# Patient Record
Sex: MALE | Race: WHITE | Employment: FULL TIME | URBAN - METROPOLITAN AREA
[De-identification: names, ages, dates, MRNs, and addresses within clinical notes are randomized per-mention and may not be internally consistent; named-entity substitution may affect disease eponyms.]

---

## 2018-10-05 RX ORDER — CEFAZOLIN SODIUM/WATER 2 G/20 ML
2 SYRINGE (ML) INTRAVENOUS ONCE
Status: CANCELLED | OUTPATIENT
Start: 2018-10-05 | End: 2018-10-05

## 2018-10-17 ENCOUNTER — HOSPITAL ENCOUNTER (OUTPATIENT)
Dept: SURGERY | Age: 48
Discharge: HOME | End: 2018-10-17
Payer: COMMERCIAL

## 2018-10-17 ENCOUNTER — ANESTHESIA EVENT (OUTPATIENT)
Dept: SURGERY | Age: 48
DRG: 455 | End: 2018-10-17
Payer: COMMERCIAL

## 2018-10-17 VITALS
TEMPERATURE: 98.1 F | HEART RATE: 84 BPM | WEIGHT: 191 LBS | RESPIRATION RATE: 18 BRPM | OXYGEN SATURATION: 98 % | SYSTOLIC BLOOD PRESSURE: 122 MMHG | HEIGHT: 69 IN | DIASTOLIC BLOOD PRESSURE: 78 MMHG | BODY MASS INDEX: 28.29 KG/M2

## 2018-10-17 LAB
ANION GAP SERPL CALC-SCNC: 6 MMOL/L (ref 7–16)
APPEARANCE UR: CLEAR
BACTERIA SPEC CULT: NORMAL
BACTERIA URNS QL MICRO: 0 /HPF
BASOPHILS # BLD: 0.1 K/UL (ref 0–0.2)
BASOPHILS NFR BLD: 1 % (ref 0–2)
BILIRUB UR QL: NEGATIVE
BUN SERPL-MCNC: 13 MG/DL (ref 6–23)
CALCIUM SERPL-MCNC: 8.7 MG/DL (ref 8.3–10.4)
CASTS URNS QL MICRO: 0 /LPF
CHLORIDE SERPL-SCNC: 106 MMOL/L (ref 98–107)
CO2 SERPL-SCNC: 30 MMOL/L (ref 21–32)
COLOR UR: YELLOW
CREAT SERPL-MCNC: 1.01 MG/DL (ref 0.8–1.5)
DIFFERENTIAL METHOD BLD: NORMAL
EOSINOPHIL # BLD: 0.2 K/UL (ref 0–0.8)
EOSINOPHIL NFR BLD: 3 % (ref 0.5–7.8)
EPI CELLS #/AREA URNS HPF: 0 /HPF
ERYTHROCYTE [DISTWIDTH] IN BLOOD BY AUTOMATED COUNT: 12.8 %
GLUCOSE SERPL-MCNC: 115 MG/DL (ref 65–100)
GLUCOSE UR STRIP.AUTO-MCNC: NEGATIVE MG/DL
HCT VFR BLD AUTO: 41.9 % (ref 41.1–50.3)
HGB BLD-MCNC: 13.7 G/DL (ref 13.6–17.2)
HGB UR QL STRIP: NEGATIVE
IMM GRANULOCYTES # BLD: 0 K/UL (ref 0–0.5)
IMM GRANULOCYTES NFR BLD AUTO: 1 % (ref 0–5)
KETONES UR QL STRIP.AUTO: NEGATIVE MG/DL
LEUKOCYTE ESTERASE UR QL STRIP.AUTO: NEGATIVE
LYMPHOCYTES # BLD: 2.2 K/UL (ref 0.5–4.6)
LYMPHOCYTES NFR BLD: 40 % (ref 13–44)
MCH RBC QN AUTO: 30.7 PG (ref 26.1–32.9)
MCHC RBC AUTO-ENTMCNC: 32.7 G/DL (ref 31.4–35)
MCV RBC AUTO: 93.9 FL (ref 79.6–97.8)
MONOCYTES # BLD: 0.7 K/UL (ref 0.1–1.3)
MONOCYTES NFR BLD: 12 % (ref 4–12)
NEUTS SEG # BLD: 2.4 K/UL (ref 1.7–8.2)
NEUTS SEG NFR BLD: 44 % (ref 43–78)
NITRITE UR QL STRIP.AUTO: NEGATIVE
NRBC # BLD: 0 K/UL (ref 0–0.2)
PH UR STRIP: 5 [PH] (ref 5–9)
PLATELET # BLD AUTO: 281 K/UL (ref 150–450)
PMV BLD AUTO: 10 FL (ref 9.4–12.3)
POTASSIUM SERPL-SCNC: 3.8 MMOL/L (ref 3.5–5.1)
PROT UR STRIP-MCNC: 30 MG/DL
RBC # BLD AUTO: 4.46 M/UL (ref 4.23–5.6)
RBC #/AREA URNS HPF: ABNORMAL /HPF
SERVICE CMNT-IMP: NORMAL
SODIUM SERPL-SCNC: 142 MMOL/L (ref 136–145)
SP GR UR REFRACTOMETRY: 1.02 (ref 1–1.02)
UROBILINOGEN UR QL STRIP.AUTO: 0.2 EU/DL (ref 0.2–1)
WBC # BLD AUTO: 5.4 K/UL (ref 4.3–11.1)
WBC URNS QL MICRO: ABNORMAL /HPF

## 2018-10-17 PROCEDURE — 85025 COMPLETE CBC W/AUTO DIFF WBC: CPT

## 2018-10-17 PROCEDURE — 81001 URINALYSIS AUTO W/SCOPE: CPT

## 2018-10-17 PROCEDURE — 80048 BASIC METABOLIC PNL TOTAL CA: CPT

## 2018-10-17 PROCEDURE — 87641 MR-STAPH DNA AMP PROBE: CPT

## 2018-10-17 RX ORDER — TRAMADOL HYDROCHLORIDE 50 MG/1
50 TABLET ORAL
COMMUNITY

## 2018-10-17 RX ORDER — OMEPRAZOLE 20 MG/1
20 CAPSULE, DELAYED RELEASE ORAL
COMMUNITY

## 2018-10-17 RX ORDER — ACETAMINOPHEN 500 MG
1000 TABLET ORAL
COMMUNITY
End: 2018-10-28

## 2018-10-17 RX ORDER — DICLOFENAC POTASSIUM 50 MG/1
50 TABLET, FILM COATED ORAL 3 TIMES DAILY
COMMUNITY
End: 2018-10-28

## 2018-10-17 NOTE — PERIOP NOTES
Patient verified name, , and surgery as listed in [facility]. Patient provided medical/health information and PTA medications to the best of their ability. TYPE  CASE: 3 Orders per surgeon: yes received and dated yes Labs per surgeon: cbc,bmp,urine,mrsa swab. Results: - 
Labs per anesthesia protocol: type and screen- dos. Results - 
EKG:  na 
  
Nasal Swab collected per MD order Patient provided with and instructed on education handouts including Guide to Surgery, blood transfusions, pain management, and hand hygiene for the family and community, and [facility] [place] [place] brochure. Road to Recovery Spine surgery patient guide given. Instructed on incentive spirometry with return demonstration. Long handled prehab sponge given with instructions for use. Patient viewed spine prehab video. Hibiclens and instructions given per hospital policy. Instructed patient to continue previous medications as prescribed prior to surgery unless otherwise directed and to take the following medications the day of surgery according to anesthesia guidelines : tylenol as needed,tramadol as needed,prilosec . Instructed patient to hold  the following medications on the day of surgery: diclofenac. Original medication prescription bottles none visualized during patient appointment. Patient teach back successful and patient demonstrates knowledge of instruction.

## 2018-10-24 NOTE — H&P
Allergies:Sulfa Medications:Diclofenac Sodium (75 MG, Take 1 tablet(s) by mouth 2 times a day as needed [PRN] PAIN); Diclofenac;TraMADol HCl;Xanax (0.5 MG, Take 1 tablet 1 hr. prior to procedure) CC: LOW BACK AND BILATRAL LEG PAIN 
 
HPI:  Preoperative check. He is 48. He has grade 2 to 3 spondylolisthesis at L5-S1. On the x-rays, the disc looks very collapsed. It does not look like we could put an interbody cage in, but on the MRI when he lays down, it reduces a little bit and the disc looks taller. Nonetheless, he basically has L5 foraminal stenosis. I went through his medical history and basically he is healthy. He is not on any blood thinners. PE:  He is a normal-appearing gentleman, in good physical shape. No gross deformity. He is alert and oriented x3. Normal affect. Pupils equal, round and reactive to light. Oropharynx is clear. Neck is without adenopathy. His lungs are clear to auscultation. Heart is regular rate and rhythm. Abdomen is soft and nontender. No hepatosplenomegaly. ASSESSMENT/PLAN:  The plan is to do an L5-S1 laminectomy, foraminotomy and interbody fusion if we could do it, although I do not think we will be able to do it, but we will need to fuse him L4 to S1 because of the negative mechanical advantage with that degree of slip. We also talked about putting a screw through the sacrum into L5. His wife is with him. They had a lot of questions, which I answered. I went through all the risks of surgery including death, paralysis, infection, bleeding, transfusion, heart attack, stroke, clot in leg, clot in lung, dural tear, failure of fusion, failure of instrumentation, development of new problems down the road, and the fact that I cannot guarantee pain relief. He goes for a preoperative check tomorrow, and surgery, I think, a week or so down the road, and it is going to be an L4 to S1 fusion, L4-L5 laminectomy and possible interbody fusion. Electronically Signed By Tia Clinton MD

## 2018-10-25 ENCOUNTER — ANESTHESIA (OUTPATIENT)
Dept: SURGERY | Age: 48
DRG: 455 | End: 2018-10-25
Payer: COMMERCIAL

## 2018-10-25 ENCOUNTER — APPOINTMENT (OUTPATIENT)
Dept: GENERAL RADIOLOGY | Age: 48
DRG: 455 | End: 2018-10-25
Attending: ORTHOPAEDIC SURGERY
Payer: COMMERCIAL

## 2018-10-25 ENCOUNTER — HOSPITAL ENCOUNTER (INPATIENT)
Age: 48
LOS: 3 days | Discharge: HOME | DRG: 455 | End: 2018-10-28
Attending: ORTHOPAEDIC SURGERY | Admitting: ORTHOPAEDIC SURGERY
Payer: COMMERCIAL

## 2018-10-25 DIAGNOSIS — M43.17 SPONDYLOLISTHESIS AT L5-S1 LEVEL: ICD-10-CM

## 2018-10-25 DIAGNOSIS — M48.07 SPINAL STENOSIS OF LUMBOSACRAL REGION: Primary | ICD-10-CM

## 2018-10-25 DIAGNOSIS — M43.17 SPONDYLOLISTHESIS OF LUMBOSACRAL REGION: ICD-10-CM

## 2018-10-25 LAB
ERYTHROCYTE [DISTWIDTH] IN BLOOD BY AUTOMATED COUNT: 12.2 %
HCT VFR BLD AUTO: 36.7 % (ref 41.1–50.3)
HGB BLD-MCNC: 12.6 G/DL (ref 13.6–17.2)
MCH RBC QN AUTO: 31 PG (ref 26.1–32.9)
MCHC RBC AUTO-ENTMCNC: 34.3 G/DL (ref 31.4–35)
MCV RBC AUTO: 90.4 FL (ref 79.6–97.8)
NRBC # BLD: 0 K/UL (ref 0–0.2)
PLATELET # BLD AUTO: 271 K/UL (ref 150–450)
PMV BLD AUTO: 9.6 FL (ref 9.4–12.3)
RBC # BLD AUTO: 4.06 M/UL (ref 4.23–5.6)
WBC # BLD AUTO: 15.9 K/UL (ref 4.3–11.1)

## 2018-10-25 PROCEDURE — 77030031139 HC SUT VCRL2 J&J -A: Performed by: ORTHOPAEDIC SURGERY

## 2018-10-25 PROCEDURE — 77030012894: Performed by: ORTHOPAEDIC SURGERY

## 2018-10-25 PROCEDURE — 74011000272 HC RX REV CODE- 272: Performed by: ORTHOPAEDIC SURGERY

## 2018-10-25 PROCEDURE — 76010000179 HC OR TIME 6 TO 6.5 HR INTENSV-TIER 1: Performed by: ORTHOPAEDIC SURGERY

## 2018-10-25 PROCEDURE — 74011250636 HC RX REV CODE- 250/636

## 2018-10-25 PROCEDURE — 0ST20ZZ RESECTION OF LUMBAR VERTEBRAL DISC, OPEN APPROACH: ICD-10-PCS | Performed by: ORTHOPAEDIC SURGERY

## 2018-10-25 PROCEDURE — 77030038601 HC DEV SYS W/CANN LITE BIO STRY -F: Performed by: ORTHOPAEDIC SURGERY

## 2018-10-25 PROCEDURE — 77030019908 HC STETH ESOPH SIMS -A: Performed by: ANESTHESIOLOGY

## 2018-10-25 PROCEDURE — 77030034760 HC NDL BN MAR ASPIR JAMSH STRY -B: Performed by: ORTHOPAEDIC SURGERY

## 2018-10-25 PROCEDURE — 77030030163 HC BN WAX J&J -A: Performed by: ORTHOPAEDIC SURGERY

## 2018-10-25 PROCEDURE — 74011250636 HC RX REV CODE- 250/636: Performed by: ORTHOPAEDIC SURGERY

## 2018-10-25 PROCEDURE — 77030039425 HC BLD LARYNG TRULITE DISP TELE -A: Performed by: ANESTHESIOLOGY

## 2018-10-25 PROCEDURE — 76210000016 HC OR PH I REC 1 TO 1.5 HR: Performed by: ORTHOPAEDIC SURGERY

## 2018-10-25 PROCEDURE — 77030013292 HC BOWL MX PRSM J&J -A: Performed by: ANESTHESIOLOGY

## 2018-10-25 PROCEDURE — 85027 COMPLETE CBC AUTOMATED: CPT

## 2018-10-25 PROCEDURE — C1713 ANCHOR/SCREW BN/BN,TIS/BN: HCPCS | Performed by: ORTHOPAEDIC SURGERY

## 2018-10-25 PROCEDURE — 77030013794 HC KT TRNSDUC BLD EDWD -B: Performed by: ANESTHESIOLOGY

## 2018-10-25 PROCEDURE — 77030032490 HC SLV COMPR SCD KNE COVD -B: Performed by: ORTHOPAEDIC SURGERY

## 2018-10-25 PROCEDURE — 77030037160 HC CGE SPN POST LUM TRITANIUM STRY -I2: Performed by: ORTHOPAEDIC SURGERY

## 2018-10-25 PROCEDURE — 65270000029 HC RM PRIVATE

## 2018-10-25 PROCEDURE — 74011000250 HC RX REV CODE- 250

## 2018-10-25 PROCEDURE — 77030037710: Performed by: ORTHOPAEDIC SURGERY

## 2018-10-25 PROCEDURE — 77030019557 HC ELECTRD VES SEAL MEDT -F: Performed by: ORTHOPAEDIC SURGERY

## 2018-10-25 PROCEDURE — 77030008477 HC STYL SATN SLP COVD -A: Performed by: ANESTHESIOLOGY

## 2018-10-25 PROCEDURE — 77030008703 HC TU ET UNCUF COVD -A: Performed by: ANESTHESIOLOGY

## 2018-10-25 PROCEDURE — 74011250636 HC RX REV CODE- 250/636: Performed by: ANESTHESIOLOGY

## 2018-10-25 PROCEDURE — 74011250637 HC RX REV CODE- 250/637: Performed by: ORTHOPAEDIC SURGERY

## 2018-10-25 PROCEDURE — 77030014647 HC SEAL FBRN TISSL BAXT -D: Performed by: ORTHOPAEDIC SURGERY

## 2018-10-25 PROCEDURE — 0SG00AJ FUSION OF LUMBAR VERTEBRAL JOINT WITH INTERBODY FUSION DEVICE, POSTERIOR APPROACH, ANTERIOR COLUMN, OPEN APPROACH: ICD-10-PCS | Performed by: ORTHOPAEDIC SURGERY

## 2018-10-25 PROCEDURE — 77030018836 HC SOL IRR NACL ICUM -A: Performed by: ORTHOPAEDIC SURGERY

## 2018-10-25 PROCEDURE — 86901 BLOOD TYPING SEROLOGIC RH(D): CPT

## 2018-10-25 PROCEDURE — 0SG0071 FUSION OF LUMBAR VERTEBRAL JOINT WITH AUTOLOGOUS TISSUE SUBSTITUTE, POSTERIOR APPROACH, POSTERIOR COLUMN, OPEN APPROACH: ICD-10-PCS | Performed by: ORTHOPAEDIC SURGERY

## 2018-10-25 PROCEDURE — 77030020407 HC IV BLD WRMR ST 3M -A: Performed by: ANESTHESIOLOGY

## 2018-10-25 PROCEDURE — 77030039194 HC KT NEURO MONITR ASTU -G: Performed by: ORTHOPAEDIC SURGERY

## 2018-10-25 PROCEDURE — 77030005401 HC CATH RAD ARRO -A: Performed by: ANESTHESIOLOGY

## 2018-10-25 PROCEDURE — 77030027138 HC INCENT SPIROMETER -A

## 2018-10-25 PROCEDURE — 76060000043 HC ANESTHESIA 6 TO 6.5 HR: Performed by: ORTHOPAEDIC SURGERY

## 2018-10-25 PROCEDURE — 74011250637 HC RX REV CODE- 250/637: Performed by: ANESTHESIOLOGY

## 2018-10-25 PROCEDURE — 77030025623 HC BUR RND PRECIS STRY -D: Performed by: ORTHOPAEDIC SURGERY

## 2018-10-25 PROCEDURE — 77030014368: Performed by: ORTHOPAEDIC SURGERY

## 2018-10-25 PROCEDURE — 36415 COLL VENOUS BLD VENIPUNCTURE: CPT

## 2018-10-25 PROCEDURE — 77030031359 HC BLD BN MILL DISP STRY -E: Performed by: ORTHOPAEDIC SURGERY

## 2018-10-25 PROCEDURE — 77030034849: Performed by: ORTHOPAEDIC SURGERY

## 2018-10-25 PROCEDURE — 72100 X-RAY EXAM L-S SPINE 2/3 VWS: CPT

## 2018-10-25 PROCEDURE — 0SG3071 FUSION OF LUMBOSACRAL JOINT WITH AUTOLOGOUS TISSUE SUBSTITUTE, POSTERIOR APPROACH, POSTERIOR COLUMN, OPEN APPROACH: ICD-10-PCS | Performed by: ORTHOPAEDIC SURGERY

## 2018-10-25 PROCEDURE — 07DR3ZZ EXTRACTION OF ILIAC BONE MARROW, PERCUTANEOUS APPROACH: ICD-10-PCS | Performed by: ORTHOPAEDIC SURGERY

## 2018-10-25 DEVICE — POSTERIOR LUMBAR CAGE
Type: IMPLANTABLE DEVICE | Site: SPINE LUMBAR | Status: FUNCTIONAL
Brand: TRITANIUM PL

## 2018-10-25 DEVICE — BLOCKER
Type: IMPLANTABLE DEVICE | Site: SPINE LUMBAR | Status: FUNCTIONAL
Brand: XIA 3

## 2018-10-25 DEVICE — POLYAXIAL SCREW
Type: IMPLANTABLE DEVICE | Site: SPINE LUMBAR | Status: FUNCTIONAL
Brand: XIA 3

## 2018-10-25 DEVICE — TI ALLOY MAX RAD ROD
Type: IMPLANTABLE DEVICE | Site: SPINE LUMBAR | Status: FUNCTIONAL
Brand: XIA 3

## 2018-10-25 DEVICE — BIO DBM PLUS PUTTY (WITH CANCELLOUS)
Type: IMPLANTABLE DEVICE | Site: SPINE LUMBAR | Status: FUNCTIONAL
Brand: BIO DBM

## 2018-10-25 DEVICE — 43-54 MM MULTI AXIAL CROSS CONNECTOR
Type: IMPLANTABLE DEVICE | Site: SPINE LUMBAR | Status: FUNCTIONAL
Brand: XIA 3

## 2018-10-25 DEVICE — GRAFT BNE SUB 20CC 2 4MM GRAN GROWTH FACT ALLGRFT OSTEOAMP: Type: IMPLANTABLE DEVICE | Site: SPINE LUMBAR | Status: FUNCTIONAL

## 2018-10-25 RX ORDER — VANCOMYCIN HYDROCHLORIDE 1 G/20ML
INJECTION, POWDER, LYOPHILIZED, FOR SOLUTION INTRAVENOUS AS NEEDED
Status: DISCONTINUED | OUTPATIENT
Start: 2018-10-25 | End: 2018-10-25 | Stop reason: HOSPADM

## 2018-10-25 RX ORDER — EPHEDRINE SULFATE 50 MG/ML
INJECTION, SOLUTION INTRAVENOUS AS NEEDED
Status: DISCONTINUED | OUTPATIENT
Start: 2018-10-25 | End: 2018-10-25 | Stop reason: HOSPADM

## 2018-10-25 RX ORDER — DEXAMETHASONE SODIUM PHOSPHATE 4 MG/ML
INJECTION, SOLUTION INTRA-ARTICULAR; INTRALESIONAL; INTRAMUSCULAR; INTRAVENOUS; SOFT TISSUE AS NEEDED
Status: DISCONTINUED | OUTPATIENT
Start: 2018-10-25 | End: 2018-10-25 | Stop reason: HOSPADM

## 2018-10-25 RX ORDER — KETAMINE HYDROCHLORIDE 100 MG/ML
INJECTION, SOLUTION INTRAMUSCULAR; INTRAVENOUS AS NEEDED
Status: DISCONTINUED | OUTPATIENT
Start: 2018-10-25 | End: 2018-10-25 | Stop reason: HOSPADM

## 2018-10-25 RX ORDER — SODIUM CHLORIDE 9 MG/ML
INJECTION, SOLUTION INTRAVENOUS
Status: DISCONTINUED | OUTPATIENT
Start: 2018-10-25 | End: 2018-10-25 | Stop reason: HOSPADM

## 2018-10-25 RX ORDER — ONDANSETRON 2 MG/ML
4 INJECTION INTRAMUSCULAR; INTRAVENOUS
Status: DISCONTINUED | OUTPATIENT
Start: 2018-10-25 | End: 2018-10-28 | Stop reason: HOSPADM

## 2018-10-25 RX ORDER — FENTANYL CITRATE 50 UG/ML
INJECTION, SOLUTION INTRAMUSCULAR; INTRAVENOUS AS NEEDED
Status: DISCONTINUED | OUTPATIENT
Start: 2018-10-25 | End: 2018-10-25 | Stop reason: HOSPADM

## 2018-10-25 RX ORDER — NALOXONE HYDROCHLORIDE 0.4 MG/ML
0.1 INJECTION, SOLUTION INTRAMUSCULAR; INTRAVENOUS; SUBCUTANEOUS AS NEEDED
Status: DISCONTINUED | OUTPATIENT
Start: 2018-10-25 | End: 2018-10-25 | Stop reason: HOSPADM

## 2018-10-25 RX ORDER — SODIUM CHLORIDE 0.9 % (FLUSH) 0.9 %
5-10 SYRINGE (ML) INJECTION EVERY 8 HOURS
Status: DISCONTINUED | OUTPATIENT
Start: 2018-10-25 | End: 2018-10-28 | Stop reason: HOSPADM

## 2018-10-25 RX ORDER — SODIUM CHLORIDE 0.9 % (FLUSH) 0.9 %
5-10 SYRINGE (ML) INJECTION AS NEEDED
Status: DISCONTINUED | OUTPATIENT
Start: 2018-10-25 | End: 2018-10-25 | Stop reason: HOSPADM

## 2018-10-25 RX ORDER — LIDOCAINE HYDROCHLORIDE 10 MG/ML
0.1 INJECTION INFILTRATION; PERINEURAL AS NEEDED
Status: DISCONTINUED | OUTPATIENT
Start: 2018-10-25 | End: 2018-10-25 | Stop reason: HOSPADM

## 2018-10-25 RX ORDER — OXYCODONE HYDROCHLORIDE 5 MG/1
5 TABLET ORAL
Status: DISCONTINUED | OUTPATIENT
Start: 2018-10-25 | End: 2018-10-25 | Stop reason: HOSPADM

## 2018-10-25 RX ORDER — HYDROMORPHONE HYDROCHLORIDE 1 MG/ML
1 INJECTION, SOLUTION INTRAMUSCULAR; INTRAVENOUS; SUBCUTANEOUS
Status: DISCONTINUED | OUTPATIENT
Start: 2018-10-25 | End: 2018-10-28 | Stop reason: HOSPADM

## 2018-10-25 RX ORDER — MIDAZOLAM HYDROCHLORIDE 1 MG/ML
2 INJECTION, SOLUTION INTRAMUSCULAR; INTRAVENOUS
Status: COMPLETED | OUTPATIENT
Start: 2018-10-25 | End: 2018-10-25

## 2018-10-25 RX ORDER — IBUPROFEN 400 MG/1
400 TABLET ORAL
Status: DISCONTINUED | OUTPATIENT
Start: 2018-10-25 | End: 2018-10-28 | Stop reason: HOSPADM

## 2018-10-25 RX ORDER — HYDROMORPHONE HYDROCHLORIDE 2 MG/ML
0.5 INJECTION, SOLUTION INTRAMUSCULAR; INTRAVENOUS; SUBCUTANEOUS
Status: DISCONTINUED | OUTPATIENT
Start: 2018-10-25 | End: 2018-10-25 | Stop reason: HOSPADM

## 2018-10-25 RX ORDER — FENTANYL CITRATE 50 UG/ML
100 INJECTION, SOLUTION INTRAMUSCULAR; INTRAVENOUS AS NEEDED
Status: DISCONTINUED | OUTPATIENT
Start: 2018-10-25 | End: 2018-10-25 | Stop reason: HOSPADM

## 2018-10-25 RX ORDER — DIPHENHYDRAMINE HYDROCHLORIDE 50 MG/ML
12.5 INJECTION, SOLUTION INTRAMUSCULAR; INTRAVENOUS ONCE
Status: DISCONTINUED | OUTPATIENT
Start: 2018-10-25 | End: 2018-10-25 | Stop reason: RX

## 2018-10-25 RX ORDER — ROCURONIUM BROMIDE 10 MG/ML
INJECTION, SOLUTION INTRAVENOUS AS NEEDED
Status: DISCONTINUED | OUTPATIENT
Start: 2018-10-25 | End: 2018-10-25 | Stop reason: HOSPADM

## 2018-10-25 RX ORDER — CEFAZOLIN SODIUM/WATER 2 G/20 ML
2 SYRINGE (ML) INTRAVENOUS ONCE
Status: COMPLETED | OUTPATIENT
Start: 2018-10-25 | End: 2018-10-25

## 2018-10-25 RX ORDER — DIPHENHYDRAMINE HCL 25 MG
25 CAPSULE ORAL
Status: DISCONTINUED | OUTPATIENT
Start: 2018-10-25 | End: 2018-10-28 | Stop reason: HOSPADM

## 2018-10-25 RX ORDER — SODIUM CHLORIDE, SODIUM LACTATE, POTASSIUM CHLORIDE, CALCIUM CHLORIDE 600; 310; 30; 20 MG/100ML; MG/100ML; MG/100ML; MG/100ML
75 INJECTION, SOLUTION INTRAVENOUS CONTINUOUS
Status: DISCONTINUED | OUTPATIENT
Start: 2018-10-25 | End: 2018-10-25 | Stop reason: HOSPADM

## 2018-10-25 RX ORDER — SODIUM CHLORIDE, SODIUM LACTATE, POTASSIUM CHLORIDE, CALCIUM CHLORIDE 600; 310; 30; 20 MG/100ML; MG/100ML; MG/100ML; MG/100ML
1000 INJECTION, SOLUTION INTRAVENOUS CONTINUOUS
Status: DISCONTINUED | OUTPATIENT
Start: 2018-10-25 | End: 2018-10-25 | Stop reason: HOSPADM

## 2018-10-25 RX ORDER — ADHESIVE BANDAGE
30 BANDAGE TOPICAL DAILY
Status: DISCONTINUED | OUTPATIENT
Start: 2018-10-26 | End: 2018-10-28 | Stop reason: HOSPADM

## 2018-10-25 RX ORDER — PROPOFOL 10 MG/ML
INJECTION, EMULSION INTRAVENOUS AS NEEDED
Status: DISCONTINUED | OUTPATIENT
Start: 2018-10-25 | End: 2018-10-25 | Stop reason: HOSPADM

## 2018-10-25 RX ORDER — NEOSTIGMINE METHYLSULFATE 1 MG/ML
INJECTION INTRAVENOUS AS NEEDED
Status: DISCONTINUED | OUTPATIENT
Start: 2018-10-25 | End: 2018-10-25 | Stop reason: HOSPADM

## 2018-10-25 RX ORDER — GABAPENTIN 100 MG/1
100 CAPSULE ORAL 2 TIMES DAILY
Status: DISCONTINUED | OUTPATIENT
Start: 2018-10-25 | End: 2018-10-28 | Stop reason: HOSPADM

## 2018-10-25 RX ORDER — ACETAMINOPHEN 500 MG
500 TABLET ORAL ONCE
Status: DISCONTINUED | OUTPATIENT
Start: 2018-10-25 | End: 2018-10-25 | Stop reason: HOSPADM

## 2018-10-25 RX ORDER — NYSTATIN 100000 [USP'U]/ML
500000 SUSPENSION ORAL 4 TIMES DAILY
Status: DISCONTINUED | OUTPATIENT
Start: 2018-10-25 | End: 2018-10-28 | Stop reason: HOSPADM

## 2018-10-25 RX ORDER — SODIUM CHLORIDE, SODIUM LACTATE, POTASSIUM CHLORIDE, CALCIUM CHLORIDE 600; 310; 30; 20 MG/100ML; MG/100ML; MG/100ML; MG/100ML
INJECTION, SOLUTION INTRAVENOUS
Status: DISCONTINUED | OUTPATIENT
Start: 2018-10-25 | End: 2018-10-25 | Stop reason: HOSPADM

## 2018-10-25 RX ORDER — SODIUM CHLORIDE 0.9 % (FLUSH) 0.9 %
5-10 SYRINGE (ML) INJECTION AS NEEDED
Status: DISCONTINUED | OUTPATIENT
Start: 2018-10-25 | End: 2018-10-28 | Stop reason: HOSPADM

## 2018-10-25 RX ORDER — GABAPENTIN 300 MG/1
600 CAPSULE ORAL ONCE
Status: COMPLETED | OUTPATIENT
Start: 2018-10-25 | End: 2018-10-25

## 2018-10-25 RX ORDER — PANTOPRAZOLE SODIUM 40 MG/1
40 TABLET, DELAYED RELEASE ORAL
Status: DISCONTINUED | OUTPATIENT
Start: 2018-10-26 | End: 2018-10-28 | Stop reason: HOSPADM

## 2018-10-25 RX ORDER — LIDOCAINE HYDROCHLORIDE 20 MG/ML
INJECTION, SOLUTION EPIDURAL; INFILTRATION; INTRACAUDAL; PERINEURAL AS NEEDED
Status: DISCONTINUED | OUTPATIENT
Start: 2018-10-25 | End: 2018-10-25 | Stop reason: HOSPADM

## 2018-10-25 RX ORDER — CEFAZOLIN SODIUM/WATER 2 G/20 ML
2 SYRINGE (ML) INTRAVENOUS EVERY 8 HOURS
Status: COMPLETED | OUTPATIENT
Start: 2018-10-25 | End: 2018-10-26

## 2018-10-25 RX ORDER — GLYCOPYRROLATE 0.2 MG/ML
INJECTION INTRAMUSCULAR; INTRAVENOUS AS NEEDED
Status: DISCONTINUED | OUTPATIENT
Start: 2018-10-25 | End: 2018-10-25 | Stop reason: HOSPADM

## 2018-10-25 RX ORDER — OXYCODONE HYDROCHLORIDE 5 MG/1
10 TABLET ORAL
Status: DISCONTINUED | OUTPATIENT
Start: 2018-10-25 | End: 2018-10-25 | Stop reason: HOSPADM

## 2018-10-25 RX ORDER — ONDANSETRON 2 MG/ML
INJECTION INTRAMUSCULAR; INTRAVENOUS AS NEEDED
Status: DISCONTINUED | OUTPATIENT
Start: 2018-10-25 | End: 2018-10-25 | Stop reason: HOSPADM

## 2018-10-25 RX ORDER — DEXTROSE, SODIUM CHLORIDE, AND POTASSIUM CHLORIDE 5; .45; .15 G/100ML; G/100ML; G/100ML
100 INJECTION INTRAVENOUS CONTINUOUS
Status: DISCONTINUED | OUTPATIENT
Start: 2018-10-25 | End: 2018-10-28 | Stop reason: HOSPADM

## 2018-10-25 RX ORDER — SODIUM CHLORIDE 0.9 % (FLUSH) 0.9 %
5-10 SYRINGE (ML) INJECTION EVERY 8 HOURS
Status: DISCONTINUED | OUTPATIENT
Start: 2018-10-25 | End: 2018-10-25 | Stop reason: HOSPADM

## 2018-10-25 RX ORDER — ONDANSETRON 2 MG/ML
4 INJECTION INTRAMUSCULAR; INTRAVENOUS ONCE
Status: DISCONTINUED | OUTPATIENT
Start: 2018-10-25 | End: 2018-10-25 | Stop reason: HOSPADM

## 2018-10-25 RX ORDER — HYDROCODONE BITARTRATE AND ACETAMINOPHEN 10; 325 MG/1; MG/1
1 TABLET ORAL
Status: DISCONTINUED | OUTPATIENT
Start: 2018-10-25 | End: 2018-10-27

## 2018-10-25 RX ORDER — TRAMADOL HYDROCHLORIDE 50 MG/1
50 TABLET ORAL
Status: DISCONTINUED | OUTPATIENT
Start: 2018-10-25 | End: 2018-10-28 | Stop reason: HOSPADM

## 2018-10-25 RX ADMIN — LIDOCAINE HYDROCHLORIDE 60 MG: 20 INJECTION, SOLUTION EPIDURAL; INFILTRATION; INTRACAUDAL; PERINEURAL at 07:21

## 2018-10-25 RX ADMIN — GABAPENTIN 100 MG: 100 CAPSULE ORAL at 17:32

## 2018-10-25 RX ADMIN — Medication 1 AMPULE: at 21:30

## 2018-10-25 RX ADMIN — HYDROCODONE BITARTRATE AND ACETAMINOPHEN 1 TABLET: 10; 325 TABLET ORAL at 21:30

## 2018-10-25 RX ADMIN — Medication 10 ML: at 23:53

## 2018-10-25 RX ADMIN — FENTANYL CITRATE 100 MCG: 50 INJECTION, SOLUTION INTRAMUSCULAR; INTRAVENOUS at 07:21

## 2018-10-25 RX ADMIN — KETAMINE HYDROCHLORIDE 20 MG: 100 INJECTION, SOLUTION INTRAMUSCULAR; INTRAVENOUS at 11:40

## 2018-10-25 RX ADMIN — Medication 5 ML: at 17:33

## 2018-10-25 RX ADMIN — KETAMINE HYDROCHLORIDE 20 MG: 100 INJECTION, SOLUTION INTRAMUSCULAR; INTRAVENOUS at 09:40

## 2018-10-25 RX ADMIN — SODIUM CHLORIDE, SODIUM LACTATE, POTASSIUM CHLORIDE, AND CALCIUM CHLORIDE 1000 ML: 600; 310; 30; 20 INJECTION, SOLUTION INTRAVENOUS at 06:12

## 2018-10-25 RX ADMIN — NEOSTIGMINE METHYLSULFATE 2 MG: 1 INJECTION INTRAVENOUS at 12:39

## 2018-10-25 RX ADMIN — ROCURONIUM BROMIDE 30 MG: 10 INJECTION, SOLUTION INTRAVENOUS at 07:22

## 2018-10-25 RX ADMIN — Medication 2 G: at 11:15

## 2018-10-25 RX ADMIN — HYDROMORPHONE HYDROCHLORIDE 1 MG: 1 INJECTION, SOLUTION INTRAMUSCULAR; INTRAVENOUS; SUBCUTANEOUS at 16:01

## 2018-10-25 RX ADMIN — EPHEDRINE SULFATE 5 MG: 50 INJECTION, SOLUTION INTRAVENOUS at 11:53

## 2018-10-25 RX ADMIN — NYSTATIN 500000 UNITS: 500000 SUSPENSION ORAL at 21:30

## 2018-10-25 RX ADMIN — TRAMADOL HYDROCHLORIDE 50 MG: 50 TABLET, FILM COATED ORAL at 20:02

## 2018-10-25 RX ADMIN — Medication 3 AMPULE: at 06:24

## 2018-10-25 RX ADMIN — FENTANYL CITRATE 25 MCG: 50 INJECTION, SOLUTION INTRAMUSCULAR; INTRAVENOUS at 12:17

## 2018-10-25 RX ADMIN — HYDROCODONE BITARTRATE AND ACETAMINOPHEN 1 TABLET: 10; 325 TABLET ORAL at 17:31

## 2018-10-25 RX ADMIN — ONDANSETRON 4 MG: 2 INJECTION INTRAMUSCULAR; INTRAVENOUS at 12:34

## 2018-10-25 RX ADMIN — DEXAMETHASONE SODIUM PHOSPHATE 4 MG: 4 INJECTION, SOLUTION INTRA-ARTICULAR; INTRALESIONAL; INTRAMUSCULAR; INTRAVENOUS; SOFT TISSUE at 07:27

## 2018-10-25 RX ADMIN — FENTANYL CITRATE 25 MCG: 50 INJECTION, SOLUTION INTRAMUSCULAR; INTRAVENOUS at 08:59

## 2018-10-25 RX ADMIN — GLYCOPYRROLATE 0.4 MG: 0.2 INJECTION INTRAMUSCULAR; INTRAVENOUS at 12:39

## 2018-10-25 RX ADMIN — HYDROMORPHONE HYDROCHLORIDE 0.5 MG: 2 INJECTION, SOLUTION INTRAMUSCULAR; INTRAVENOUS; SUBCUTANEOUS at 14:05

## 2018-10-25 RX ADMIN — PROPOFOL 200 MG: 10 INJECTION, EMULSION INTRAVENOUS at 07:21

## 2018-10-25 RX ADMIN — Medication 10 ML: at 21:31

## 2018-10-25 RX ADMIN — SODIUM CHLORIDE, SODIUM LACTATE, POTASSIUM CHLORIDE, CALCIUM CHLORIDE: 600; 310; 30; 20 INJECTION, SOLUTION INTRAVENOUS at 09:58

## 2018-10-25 RX ADMIN — SODIUM CHLORIDE, SODIUM LACTATE, POTASSIUM CHLORIDE, CALCIUM CHLORIDE: 600; 310; 30; 20 INJECTION, SOLUTION INTRAVENOUS at 07:15

## 2018-10-25 RX ADMIN — EPHEDRINE SULFATE 5 MG: 50 INJECTION, SOLUTION INTRAVENOUS at 12:10

## 2018-10-25 RX ADMIN — HYDROMORPHONE HYDROCHLORIDE 1 MG: 1 INJECTION, SOLUTION INTRAMUSCULAR; INTRAVENOUS; SUBCUTANEOUS at 23:51

## 2018-10-25 RX ADMIN — KETAMINE HYDROCHLORIDE 20 MG: 100 INJECTION, SOLUTION INTRAMUSCULAR; INTRAVENOUS at 08:40

## 2018-10-25 RX ADMIN — KETAMINE HYDROCHLORIDE 40 MG: 100 INJECTION, SOLUTION INTRAMUSCULAR; INTRAVENOUS at 07:40

## 2018-10-25 RX ADMIN — Medication 2 G: at 19:32

## 2018-10-25 RX ADMIN — KETAMINE HYDROCHLORIDE 20 MG: 100 INJECTION, SOLUTION INTRAMUSCULAR; INTRAVENOUS at 10:40

## 2018-10-25 RX ADMIN — GABAPENTIN 600 MG: 300 CAPSULE ORAL at 07:06

## 2018-10-25 RX ADMIN — Medication 2 G: at 07:30

## 2018-10-25 RX ADMIN — DEXTROSE MONOHYDRATE, SODIUM CHLORIDE, AND POTASSIUM CHLORIDE 100 ML/HR: 50; 4.5; 1.49 INJECTION, SOLUTION INTRAVENOUS at 17:28

## 2018-10-25 RX ADMIN — SODIUM CHLORIDE: 9 INJECTION, SOLUTION INTRAVENOUS at 08:05

## 2018-10-25 RX ADMIN — MIDAZOLAM HYDROCHLORIDE 2 MG: 2 INJECTION, SOLUTION INTRAMUSCULAR; INTRAVENOUS at 07:10

## 2018-10-25 NOTE — ANESTHESIA PROCEDURE NOTES
Arterial Line Placement Start time: 10/25/2018 7:22 AM 
End time: 10/25/2018 7:27 AM 
Performed by: Austin Sotelo CRNA Authorized by: Bi Jimenez MD  
 
Pre-Procedure Indications:  Arterial pressure monitoring and blood sampling Preanesthetic Checklist: patient identified, risks and benefits discussed, anesthesia consent, site marked, patient being monitored, timeout performed and patient being monitored Timeout Time: 07:22 Procedure:  
Prep:  Chlorhexidine Seldinger Technique?: No   
Orientation:  Right Location:  Radial artery Catheter size:  20 G Number of attempts:  1 Assessment:  
Post-procedure:  Sterile dressing applied Patient Tolerance:  Patient tolerated the procedure well with no immediate complications

## 2018-10-25 NOTE — PROGRESS NOTES
TRANSFER - IN REPORT: 
 
Verbal report received from [facility] RN(name) on Meghana Govea  being received from PACU(unit) for routine post - op Report consisted of patients Situation, Background, Assessment and  
Recommendations(SBAR). Information from the following report(s) SBAR, Kardex, OR Summary, Intake/Output, MAR, Recent Results and Cardiac Rhythm sinus tach was reviewed with the receiving nurse. Opportunity for questions and clarification was provided. Assessment completed upon patients arrival to unit and care assumed.

## 2018-10-25 NOTE — OP NOTES
[facility] REPORT    Tobin Ruvalcaba  MR#: [id number]  : 1970  ACCOUNT #: [de-identified]   DATE OF SERVICE: 10/25/2018    PREOPERATIVE DIAGNOSIS:  L5-S1 grade III to IV spondylolisthesis with severe foraminal stenosis    POSTOPERATIVE DIAGNOSIS:  L5-S1 grade III to IV spondylolisthesis with severe foraminal stenosis    PROCEDURES PERFORMED:    1.  Bilateral L5-S1 laminectomy, partial facetectomy, foraminotomy to free up the compressed L5 nerve roots and no interbody fusion was done at this level. 2.  L4-5 bilateral laminectomy for placement of interbody fusion device. 3.  Placement of biomechanical interbody fusion device, at L4-5 using the Nenzel Tritanium implant. 4. L4-S1 posterior spinal fusion. 5.  Placement of segmental spinal instrumentation L4-S1 using Miky pedicle screw and david system. 6.  Left iliac crest bone marrow aspiration. 7.  Use of OsteoAMP local bone graft and the decompression and demineralized bone matrix putty for fusion. SURGEON:  Talib Howard III, MD    ASSISTANT:  None. ANESTHESIA:  GETA. ESTIMATED BLOOD LOSS:  150 mL. FLUIDS:  A 1600 mL crystalloid. DRAINS:  1 Hemovac. SPECIMENS REMOVED:  None. IMPLANTS:  Nenzel. COMPLICATIONS:  None. INDICATIONS:  The patient is a 44-year-old white male who has had progressively worsening back and radicular leg pain on the left side secondary to a severe grade III to IV spondylolisthesis with severe foraminal stenosis who really did not have any significant central stenosis. He failed to get better with conservative measures and he is brought for surgical treatment, but because of the degree of slippage a single level fusion would not be biomechanically stable, and therefore, he had to have a 2 level fusion. PROCEDURE:  The patient was brought to the operating room.   After administration of anesthesia, IV antibiotics and placement of monitoring lines, he was positioned prone on the [facility] frame. His back was prepped with Betadine and sterilely draped. At this point, surgeon called a timeout and confirmed the procedure to be performed, his allergy history and the fact that he had received preoperative IV antibiotics. At this point, C-arm came in. We identified the L4-L5 and L5-S1 level, marked it on the skin, made a midline incision of this with a scalpel, dissected down to subcutaneous tissue to the deep fascia with electrocautery and then incised on either side of the spinous processes and dissected down along the lamina out to the facet joint. The anatomy was difficult because of the degree of slippage and kyphotic angulation. We had to take several x-rays with markers in different locations to truly determine our location on the spine. Once we had done that, we stripped out the lateral gutter from the L4 process to the L5 and down to the sacral ala bilaterally, and we then removed the interspinous ligament at L4-L5 and L5-S. Then removed the spinous processes and used them for local bone graft run through a bone mill. We also used bone harvested from the decompression as local bone graft run through the bone mill as well. We then used Kerrisons to perform a bilateral L5-S1 laminectomy, extensive partial facetectomy and extensive foraminotomy. The L5 nerve roots were very significantly compressed and the foramen was notably tight. After we had decompressed the foramen, the nerves significantly increased in size or swelled, most notably, the left L5 nerve. We were careful during this to minimize manipulation of the nerve root. Then, we continued the decompression up to the L4-5 area so that we could place an interbody device as the disk was very tall and felt that we needed extra stability.   So after doing the laminectomy, we identified the disk space cauterized overlying bleeders with the bipolar, retracted the neural structures towards the midline burred out the medial aspect of facet joint with the bur and a Kerrison punch and incised the disk annulus with a scalpel and removed this with pituitaries. This was the L4-L5 disk. We used the distractors, the angelique to remove all of the disk and cartilaginous endplates as well and then we trialled starting with a 9 mm tall trial and eventually a 12 mm interbody trial device had a nice tight fit, so we elected to use a 12 mm Tritanium interbody device. We harvested bone marrow aspirates from left iliac crest inserting the needle in the crest, removing the stylet and suctioning off 15 mL of bone marrow aspirate, which was mixed with the OsteoAMP and the local bone graft. Then, we injected 2.5 mL of the OsteoAMP local bone graft into the L4-5 disk space using a bone graft gun, packed it and tamped it anteriorly and then the interbody device was also packed with the local bone graft OsteoAMP and then it was inserted from the left side and tamped anteriorly with neural structures retracted. The final seating of the interbody device was done under lateral x-ray image to make sure it was appropriately positioned anteriorly. Once we had done that, we irrigated the wound and suctioned it dry. We would initially have liked to have done an interbody fusion at L5-S1, but the slippage was too great, tension on the nerves was too much for that. So then we needed to place our instrumentation, pedicle screws L4-S1 and this was difficult because of the huge spondylolisthesis and the kyphotic angulation and a hyperlordotic angulation of the lumbar spine. We identified the appropriate area for pedicle screws. We burred a starting hole and then inserted the Steffee probe down through this, and we probed it with a ball tip probe and also used the nerve stimulator to make sure we were not in proximity of any nerves.   Then we tapped it with a 5.5 mm tap reprobed and use the nerve   stimulator and all areas stimulated at least 10-15 milliamps not lower. We then decorticated the lateral gutters with a high speed bur and we packed half our local bone graft ostia mixture into each lateral gutter. We then placed 6.5, 40 and 45 mm pedicle screws from L4-S1 bilaterally and we checked with C-arm to make sure the screws were well positioned and they appeared to be. We placed rods into the screws and placed the locking nuts and tightened these. We also placed a cross connector and attached it to each david for extra stability and tightened this. We used a torque wrench to torque down the locking cap on each screw. We then placed demineralized bone matrix putty in each lateral gutter on top of the bone graft, pressed it into place to keep the bone graft stable. We suctioned out the canal and covered this with FloSeal for hemostasis. We placed a gram of vancomycin powder in the wound, placed a large Hemovac drain in the wound and we were ready to close the wound. Deep fascia was reapproximated with interrupted figure-of-eight stitches of #1 Vicryl. Subcutaneous tissue was closed with interrupted stitches of 2-0 Vicryl and the skin was closed with a running subcuticular stitch of 3-0 Vicryl. Steri-Strips, dry sterile dressings were applied. The patient was then rolled supine to the recovery room bed, having tolerated the procedure well.       Kaya Arthur III, MD SEL / HITESH  D: 10/25/2018 13:07     T: 10/25/2018 15:47  JOB #: [id number]  CC: Eleonora Gilbert MD

## 2018-10-25 NOTE — ROUTINE PROCESS
TRANSFER - OUT REPORT: 
 
Verbal report given to Yuko Castorena on Rocio Love  being transferred to  for routine post - op Report consisted of patients Situation, Background, Assessment and  
Recommendations(SBAR). Information from the following report(s) SBAR, Kardex, OR Summary, Procedure Summary, Intake/Output and MAR was reviewed with the receiving nurse. Lines:  
Peripheral IV 10/25/18 Right Hand (Active) Site Assessment Clean, dry, & intact 10/25/2018  2:36 PM  
Phlebitis Assessment 0 10/25/2018  2:36 PM  
Infiltration Assessment 0 10/25/2018  2:36 PM  
Dressing Status Clean, dry, & intact 10/25/2018  2:36 PM  
Dressing Type Transparent;Tape 10/25/2018  2:36 PM  
Hub Color/Line Status Infusing 10/25/2018  2:36 PM  
Alcohol Cap Used No 10/25/2018  2:36 PM  
   
Peripheral IV 10/25/18 Left Hand (Active) Site Assessment Clean, dry, & intact 10/25/2018  2:36 PM  
Phlebitis Assessment 0 10/25/2018  2:36 PM  
Infiltration Assessment 0 10/25/2018  2:36 PM  
Dressing Status Clean, dry, & intact 10/25/2018  2:36 PM  
Dressing Type Transparent;Tape 10/25/2018  2:36 PM  
Hub Color/Line Status Capped 10/25/2018  2:36 PM  
Alcohol Cap Used No 10/25/2018  2:36 PM  
  
 
Opportunity for questions and clarification was provided. Patient transported with: 
 O2 @ 3 liters VTE prophylaxis orders have been written for Rocio Love. Patient and family given floor number and nurses name. Family updated re: pt status after security code verified.

## 2018-10-25 NOTE — PROGRESS NOTES
10/25/18 1540 Dual Skin Pressure Injury Assessment Dual Skin Pressure Injury Assessment WDL Second Care Provider (Based on [address] [place] [address]) [address] Skin Integumentary Skin Integumentary (WDL) X Skin Integrity Incision (comment) (back) Dressing to back, with hemovac, clean dry and intact. No other skin issues noted at this time

## 2018-10-25 NOTE — ANESTHESIA PREPROCEDURE EVALUATION
Anesthetic History No history of anesthetic complications Review of Systems / Medical History Patient summary reviewed and pertinent labs reviewed Pulmonary Within defined limits Neuro/Psych Within defined limits Cardiovascular Within defined limits Exercise tolerance: >4 METS 
  
GI/Hepatic/Renal 
  
GERD Endo/Other Arthritis Other Findings Physical Exam 
 
Airway Mallampati: II 
TM Distance: 4 - 6 cm Neck ROM: normal range of motion Mouth opening: Normal 
 
 Cardiovascular Rhythm: regular Rate: normal 
 
 
 
 Dental 
No notable dental hx Pulmonary Breath sounds clear to auscultation Abdominal 
GI exam deferred Other Findings Anesthetic Plan ASA: 2 Anesthesia type: general 
 
Monitoring Plan: Arterial line Induction: Intravenous Anesthetic plan and risks discussed with: Patient

## 2018-10-25 NOTE — ANESTHESIA POSTPROCEDURE EVALUATION
Procedure(s): 
L4-S1 LAMI FUSION ; L4-L5 TLIF. Anesthesia Post Evaluation Multimodal analgesia: multimodal analgesia not used between 6 hours prior to anesthesia start to PACU discharge Patient location during evaluation: bedside Patient participation: complete - patient participated Level of consciousness: awake and alert Pain score: 3 Pain management: adequate Airway patency: patent Anesthetic complications: no 
Cardiovascular status: acceptable and hemodynamically stable Respiratory status: acceptable Hydration status: acceptable Visit Vitals /64 Pulse (!) 101 Temp 36.7 °C (98.1 °F) Resp 13 Ht 5' 9\" (1.753 m) Wt 85.4 kg (188 lb 3 oz) SpO2 100% BMI 27.79 kg/m²

## 2018-10-25 NOTE — BRIEF OP NOTE
BRIEF OPERATIVE NOTE Date of Procedure: 10/25/2018 Preoperative Diagnosis: Lumbar stenosis with neurogenic claudication [M48.062] Spondylolisthesis, unspecified spinal region [M43.10] Postoperative Diagnosis: Lumbar stenosis with neurogenic claudication [M48.062] Spondylolisthesis, unspecified spinal region [M43.10] Procedure(s): 
L4-S1 LAMI FUSION ; L4-L5 TLIF Surgeon(s) and Role: 
   * Tish Blanchard MD - Primary Surgical Assistant: none Surgical Staff: 
Circ-1: Randee Cardenas RN 
Circ-Relief: Seema Silva RN Radiology Technician: Toi Diaz, RT, R, CT Scrub Tech-1: Richard Monster Scrub Tech-2: Arturo Gutierrez Scrub Tech-Relief: Jelly Keating Event Time In Time Out Incision Start 0800 Incision Close Anesthesia: General  
Estimated Blood Loss: 450cc Specimens: * No specimens in log * Findings: foraminal stenosis Complications: none Implants:  
Implant Name Type Inv. Item Serial No.  Lot No. LRB No. Used Action GRAFT BNE GRAN DBM 2-4MM 20ML -- OSTEOAMP - [id number]  GRAFT BNE GRAN DBM 2-4MM 20ML -- OSTEOAMP [id number] [facility]  N/A 1 Implanted CAGE LUMBAR [id number] STRL -- TRITANIUM PL - [id number]  CAGE LUMBAR [id number] STRL -- TRITANIUM PL  UBALDO SPINE HOW [id number] N/A 1 Implanted GRAFT DBM PTTY+ W/CHIP 10ML -- BIO DBM - [id number]  GRAFT DBM PTTY+ W/CHIP 10ML -- BIO DBM  UBALDO SPINE HOWM [id number] N/A 1 Implanted GRAFT DBM PTTY+ W/CHIP 10ML -- BIO DBM - [id number]  GRAFT DBM PTTY+ W/CHIP 10ML -- BIO DBM  UBALDO SPINE HOW [id number] N/A 1 Implanted BLOCKER SPNE BALA 3 TI --  - [id number]  BLOCKER SPNE BALA 3 TI --   UBALDO SPINE HOW [id number] N/A 4 Implanted SCR SPNE BALA 3 6.5X45MM TI --  - [id number]  SCR SPNE BALA 3 6.5X45MM TI --   UBALDO SPINE [facility] [id number] N/A 2 Implanted SCR SPNE BALA 3 6.5X50MM TI --  - [id number]  SCR SPNE BALA 3 6.5X50MM TI -- UBALDO SPINE HOWM [id number] N/A 1 Implanted SCR SPNE BALA 3 6.5X40MM TI --  - [id number]  SCR SPNE BALA 3 6.5X40MM TI --   UBALDO SPINE HOWM [id number] N/A 1 Implanted ALEKSANDRA SPNE MAX BALA 3 6.0X50MM TI --  - [id number]  ALEKSANDRA SPNE MAX BALA 3 6.0X50MM TI --   UBALDO SPINE HOWM [id number] N/A 2 Implanted CONN SPNE BALA 3 CRSLNK 43MM --  - [id number]  CONN SPNE BALA 3 CRSLNK 43MM --   UBALDO SPINE HOWM [id number] N/A 1 Implanted

## 2018-10-26 PROBLEM — M43.17 SPONDYLOLISTHESIS AT L5-S1 LEVEL: Status: ACTIVE | Noted: 2018-10-26

## 2018-10-26 LAB
ABO + RH BLD: NORMAL
BLOOD GROUP ANTIBODIES SERPL: NORMAL
ERYTHROCYTE [DISTWIDTH] IN BLOOD BY AUTOMATED COUNT: 12.6 %
HCT VFR BLD AUTO: 35.7 % (ref 41.1–50.3)
HGB BLD-MCNC: 12.1 G/DL (ref 13.6–17.2)
MCH RBC QN AUTO: 31.2 PG (ref 26.1–32.9)
MCHC RBC AUTO-ENTMCNC: 33.9 G/DL (ref 31.4–35)
MCV RBC AUTO: 92 FL (ref 79.6–97.8)
NRBC # BLD: 0 K/UL (ref 0–0.2)
PLATELET # BLD AUTO: 272 K/UL (ref 150–450)
PMV BLD AUTO: 9.9 FL (ref 9.4–12.3)
RBC # BLD AUTO: 3.88 M/UL (ref 4.23–5.6)
SPECIMEN EXP DATE BLD: NORMAL
WBC # BLD AUTO: 14.9 K/UL (ref 4.3–11.1)

## 2018-10-26 PROCEDURE — 74011250637 HC RX REV CODE- 250/637: Performed by: ORTHOPAEDIC SURGERY

## 2018-10-26 PROCEDURE — G8979 MOBILITY GOAL STATUS: HCPCS

## 2018-10-26 PROCEDURE — 94760 N-INVAS EAR/PLS OXIMETRY 1: CPT

## 2018-10-26 PROCEDURE — 36415 COLL VENOUS BLD VENIPUNCTURE: CPT

## 2018-10-26 PROCEDURE — 97161 PT EVAL LOW COMPLEX 20 MIN: CPT

## 2018-10-26 PROCEDURE — 97530 THERAPEUTIC ACTIVITIES: CPT

## 2018-10-26 PROCEDURE — G8978 MOBILITY CURRENT STATUS: HCPCS

## 2018-10-26 PROCEDURE — 65270000029 HC RM PRIVATE

## 2018-10-26 PROCEDURE — 74011250636 HC RX REV CODE- 250/636: Performed by: ORTHOPAEDIC SURGERY

## 2018-10-26 PROCEDURE — 77010033678 HC OXYGEN DAILY

## 2018-10-26 PROCEDURE — 85027 COMPLETE CBC AUTOMATED: CPT

## 2018-10-26 RX ADMIN — Medication 10 ML: at 06:27

## 2018-10-26 RX ADMIN — NYSTATIN 500000 UNITS: 500000 SUSPENSION ORAL at 18:20

## 2018-10-26 RX ADMIN — GABAPENTIN 100 MG: 100 CAPSULE ORAL at 09:20

## 2018-10-26 RX ADMIN — DEXTROSE MONOHYDRATE, SODIUM CHLORIDE, AND POTASSIUM CHLORIDE 100 ML/HR: 50; 4.5; 1.49 INJECTION, SOLUTION INTRAVENOUS at 04:44

## 2018-10-26 RX ADMIN — Medication 2 G: at 14:00

## 2018-10-26 RX ADMIN — NYSTATIN 500000 UNITS: 500000 SUSPENSION ORAL at 14:30

## 2018-10-26 RX ADMIN — HYDROMORPHONE HYDROCHLORIDE 1 MG: 1 INJECTION, SOLUTION INTRAMUSCULAR; INTRAVENOUS; SUBCUTANEOUS at 11:34

## 2018-10-26 RX ADMIN — Medication 1 AMPULE: at 22:49

## 2018-10-26 RX ADMIN — HYDROMORPHONE HYDROCHLORIDE 1 MG: 1 INJECTION, SOLUTION INTRAMUSCULAR; INTRAVENOUS; SUBCUTANEOUS at 16:28

## 2018-10-26 RX ADMIN — PANTOPRAZOLE SODIUM 40 MG: 40 TABLET, DELAYED RELEASE ORAL at 06:26

## 2018-10-26 RX ADMIN — Medication 2 G: at 06:25

## 2018-10-26 RX ADMIN — NYSTATIN 500000 UNITS: 500000 SUSPENSION ORAL at 09:19

## 2018-10-26 RX ADMIN — Medication 5 ML: at 14:30

## 2018-10-26 RX ADMIN — Medication 1 AMPULE: at 09:20

## 2018-10-26 RX ADMIN — MAGNESIUM HYDROXIDE 30 ML: 400 SUSPENSION ORAL at 09:20

## 2018-10-26 RX ADMIN — HYDROCODONE BITARTRATE AND ACETAMINOPHEN 1 TABLET: 10; 325 TABLET ORAL at 18:20

## 2018-10-26 RX ADMIN — NYSTATIN 500000 UNITS: 500000 SUSPENSION ORAL at 22:49

## 2018-10-26 RX ADMIN — HYDROMORPHONE HYDROCHLORIDE 1 MG: 1 INJECTION, SOLUTION INTRAMUSCULAR; INTRAVENOUS; SUBCUTANEOUS at 21:30

## 2018-10-26 RX ADMIN — HYDROCODONE BITARTRATE AND ACETAMINOPHEN 1 TABLET: 10; 325 TABLET ORAL at 09:19

## 2018-10-26 RX ADMIN — GABAPENTIN 100 MG: 100 CAPSULE ORAL at 18:20

## 2018-10-26 RX ADMIN — HYDROCODONE BITARTRATE AND ACETAMINOPHEN 1 TABLET: 10; 325 TABLET ORAL at 14:30

## 2018-10-26 RX ADMIN — Medication 10 ML: at 21:31

## 2018-10-26 RX ADMIN — DEXTROSE MONOHYDRATE, SODIUM CHLORIDE, AND POTASSIUM CHLORIDE 100 ML/HR: 50; 4.5; 1.49 INJECTION, SOLUTION INTRAVENOUS at 14:31

## 2018-10-26 RX ADMIN — HYDROCODONE BITARTRATE AND ACETAMINOPHEN 1 TABLET: 10; 325 TABLET ORAL at 04:44

## 2018-10-26 NOTE — PROGRESS NOTES
Problem: Mobility Impaired (Adult and Pediatric) Goal: *Acute Goals and Plan of Care (Insert Text) STG: 
(1.)Mr. Jared Valle will move from supine to sit and sit to supine , scoot up and down and roll side to side with CONTACT GUARD ASSIST within 3 treatment day(s). (2.)Mr. Jared Valle will transfer from bed to chair and chair to bed with STAND BY ASSIST using the least restrictive device within 3 treatment day(s). (3.)Mr. Jared Valle will ambulate with CONTACT GUARD ASSIST for 100 feet with the least restrictive device within 3 treatment day(s). LTG: 
(1.)Mr. Jared Valle will move from supine to sit and sit to supine , scoot up and down and roll side to side in bed with MODIFIED INDEPENDENCE within 7 treatment day(s). (2.)Mr. Jared Valle will transfer from bed to chair and chair to bed with MODIFIED INDEPENDENCE using the least restrictive device within 7 treatment day(s). (3.)Mr. Jared Valle will ambulate with MODIFIED INDEPENDENCE for 250+ feet with the least restrictive device within 7 treatment day(s). (4.)Mr. Simental will ascend/descend 6 steps with one rail and MINIMAL ASSIST within 7 treatment days. ________________________________________________________________________________________________ PHYSICAL THERAPY: Initial Assessment, Treatment Day: Day of Assessment, AM 10/26/2018OUTPATIENT: Hospital Day: 2 Payor: BLUE CROSS / Plan: [facility] / Product Type: PPO /  
  
NAME/AGE/GENDER: Ozzy Leon is a 57 y.o. male PRIMARY DIAGNOSIS: Lumbar stenosis with neurogenic claudication [M48.062] Spondylolisthesis, unspecified spinal region [M43.10] Spinal stenosis of lumbosacral region Spinal stenosis of lumbosacral region Procedure(s) (LRB): 
L4-S1 LAMI FUSION ; L4-L5 TLIF (N/A) 1 Day Post-Op ICD-10: Treatment Diagnosis: · Difficulty in walking, Not elsewhere classified (R26.2) Precaution/Allergies: 
Sulfa (sulfonamide antibiotics) Spinal brace ASSESSMENT:  
 Mr. Obi Hua is a 57 y.o. Male s/p L4-S1 LAMI FUSION ; L4-L5 TLIF. Pt is supine in bed upon contact with venecia at bedside, A&O x 4, and agreeable to PT Evaluation. Pt states he is staying with his fiancee, ambulates independently, drives, works full time job requiring prolonged standing and walking, no falls, and has the following DME at home: RW, SPC, and elevated toilet seat. Pt reports 7/10 post op incisional back pain currently. Educated pt on log roll technique and spinal precautions prior to mobility. Pt performed log roll technique to sitting EOB with Morris-ModA and frequent cuing for sequencing and hand placement. Pt demonstrates good sitting balance but sits in prop sitting due to pain. Educated pt on donning/doffing back brace, pt required assistance and cuing to don correctly. Pt first attempt for STS unsuccessful, but successful with second attempt with Morris and cuing for increased forward weight shift and pushing from bed rather than pulling on RW. Pt demonstrates fair standing balance with support. Pt ambulated 3ft to bedside chair with RW and CGA, demonstrating slow, shuffling gait with cuing for safety awareness and CGA for controlled descent to chair. Educated pt on PT POC and frequent position changes. Pt left upright in chair with all needs met and within reach with venecia at bedside. Pt will benefit from skilled therapy for duration of hospital stay to address decreased activity tolerance and functional mobility. This section established at most recent assessment PROBLEM LIST (Impairments causing functional limitations): 1. Decreased Transfer Abilities 2. Decreased Ambulation Ability/Technique 3. Decreased Balance 4. Increased Pain 5. Decreased Activity Tolerance 6. Decreased Pacing Skills 7. Increased Fatigue 8. Decreased Knowledge of Precautions 9.  Decreased [place] with Home Exercise Program 
 INTERVENTIONS PLANNED: (Benefits and precautions of physical therapy have been discussed with the patient.) 1. Balance Exercise 2. Bed Mobility 3. Family Education 4. Gait Training 5. Home Exercise Program (HEP) 6. Manual Therapy 7. Neuromuscular Re-education/Strengthening 8. Range of Motion (ROM) 9. Therapeutic Activites 10. Therapeutic Exercise/Strengthening 11. Transfer Training TREATMENT PLAN: Frequency/Duration: twice daily for duration of hospital stay Rehabilitation Potential For Stated Goals: Good RECOMMENDED REHABILITATION/EQUIPMENT: (at time of discharge pending progress): Due to the probability of continued deficits (see above) this patient will likely need continued skilled physical therapy after discharge. Equipment:  
? None at this time HISTORY:  
History of Present Injury/Illness (Reason for Referral): 
Procedure(s) (LRB): 
L4-S1 LAMI FUSION ; L4-L5 TLIF (N/A) Past Medical History/Comorbidities:  
Mr. Moshe Chang  has a past medical history of Back pain and GERD (gastroesophageal reflux disease). Mr. Moshe Chang  has no past surgical history on file. Social History/Living Environment:  
Home Environment: Apartment # Steps to Enter: 18(up 6+down 12) Rails to Enter: Yes Hand Rails : Right Wheelchair Ramp: No 
One/Two Story Residence: One story # of Interior Steps: 12 Living Alone: No 
Support Systems: Spouse/Significant Other/Partner Patient Expects to be Discharged to[de-identified] Private residence Current DME Used/Available at Home: Raised toilet seat, Cane, straight, Walker, rolling Tub or Shower Type: Tub/Shower combination Prior Level of Function/Work/Activity: 
Pt states he is staying with his fiancee, ambulates independently, drives, works full time job requiring prolonged standing and walking, no falls, and has the following DME at home: RW, SPC, and elevated toilet seat. Number of Personal Factors/Comorbidities that affect the Plan of Care: 3+: HIGH COMPLEXITY EXAMINATION:  
Most Recent Physical Functioning:  
Gross Assessment: AROM: Within functional limits Strength: Within functional limits(limited by pain currently) Coordination: Within functional limits Posture: 
  
Balance: 
Sitting: Impaired Sitting - Static: Good (unsupported) Sitting - Dynamic: Fair (occasional) Standing: Impaired Standing - Static: Fair Standing - Dynamic : Fair Bed Mobility: 
Rolling: Minimum assistance Supine to Sit: Moderate assistance Scooting: Minimum assistance Wheelchair Mobility: 
  
Transfers: 
Sit to Stand: Minimum assistance Stand to Sit: Contact guard assistance Gait: 
  
Speed/Cheri: Shuffled; Slow Step Length: Left shortened;Right shortened Gait Abnormalities: Decreased step clearance;Shuffling gait Distance (ft): 3 Feet (ft) Assistive Device: Brace/Splint; Walker, rolling Ambulation - Level of Assistance: Contact guard assistance Interventions: Verbal cues; Visual/Demos; Safety awareness training Body Structures Involved: 1. Heart 2. Lungs 3. Bones 4. Joints 5. Muscles 6. Ligaments Body Functions Affected: 1. Sensory/Pain 2. Cardio 3. Respiratory 4. Neuromusculoskeletal 
5. Movement Related Activities and Participation Affected: 1. Mobility 2. Domestic Life 3. Interpersonal Interactions and Relationships 4. Community, Social and [place] Number of elements that affect the Plan of Care: 4+: HIGH COMPLEXITY CLINICAL PRESENTATION:  
Presentation: Evolving clinical presentation with changing clinical characteristics: MODERATE COMPLEXITY CLINICAL DECISION MAKIN [facility] [address] [id number] AM-PAC 6 Clicks Basic Mobility Inpatient Short Form How much difficulty does the patient currently have. .. Unable A Lot A Little None 1. Turning over in bed (including adjusting bedclothes, sheets and blankets)? [] 1   [] 2   [x] 3   [] 4  
2.   Sitting down on and standing up from a chair with arms ( e.g., wheelchair, bedside commode, etc.)   [] 1   [] 2   [x] 3   [] 4  
 3. Moving from lying on back to sitting on the side of the bed? [] 1   [x] 2   [] 3   [] 4 How much help from another person does the patient currently need. .. Total A Lot A Little None 4. Moving to and from a bed to a chair (including a wheelchair)? [] 1   [] 2   [x] 3   [] 4  
5. Need to walk in hospital room? [] 1   [] 2   [x] 3   [] 4  
6. Climbing 3-5 steps with a railing? [] 1   [x] 2   [] 3   [] 4  
© 2007, Trustees of [facility], under license to [employer]. All rights reserved Score:  Initial: 16 Most Recent: X (Date: -- ) Interpretation of Tool:  Represents activities that are increasingly more difficult (i.e. Bed mobility, Transfers, Gait). Score 24 23 22-20 19-15 14-10 9-7 6 Modifier CH CI CJ CK CL CM CN   
 
? Mobility - Walking and Moving Around:  
  - CURRENT STATUS: CK - 40%-59% impaired, limited or restricted  - GOAL STATUS: CJ - 20%-39% impaired, limited or restricted  - D/C STATUS:  ---------------To be determined--------------- Payor: LIZZY JACKSON / Plan: LEBRON Aguirre / Product Type: PPO /   
 
Medical Necessity:    
· Patient demonstrates good rehab potential due to higher previous functional level. Reason for Services/Other Comments: 
· Patient continues to require skilled intervention due to impaired activity tolerance and functional mobility. Use of outcome tool(s) and clinical judgement create a POC that gives a: Clear prediction of patient's progress: LOW COMPLEXITY  
  
 
 
 
TREATMENT:  
(In addition to Assessment/Re-Assessment sessions the following treatments were rendered) Pre-treatment Symptoms/Complaints: \"My back hurts\" Pain: Initial:  
Pain Intensity 1: 7 Pain Location 1: Back, Incisional 
Pain Orientation 1: Lower Pain Intervention(s) 1: Ambulation/Increased Activity  Post Session:  Increased with mobility, 7/10 In addition to PT Evaluation: Therapeutic Activity: (    8 minutes): Therapeutic activities including Bed transfers, Chair transfers, Ambulation on level ground and education on don/doff brace, spinal precautions, log roll technique, and STS technique to improve mobility, strength, balance and coordination. Required moderate Verbal cues; Visual/Demos; Safety awareness training to promote dynamic balance in standing and promote coordination of bilateral, upper extremity(s), lower extremity(s). Braces/Orthotics/Lines/Etc:  
· IV 
· drain hemovac · lumbar brace Treatment/Session Assessment:   
· Response to Treatment:  See above · Interdisciplinary Collaboration:  
o Physical Therapist 
o Registered Nurse · After treatment position/precautions:  
o Up in chair 
o Bed/Chair-wheels locked 
o Bed in low position 
o Caregiver at bedside 
o Call light within reach · Compliance with Program/Exercises: Will assess as treatment progresses · Recommendations/Intent for next treatment session: \"Next visit will focus on advancements to more challenging activities and reduction in assistance provided\". Total Treatment Duration: PT Patient Time In/Time Out Time In: 3827 Time Out: 1034 Swathi Cuellar PT

## 2018-10-26 NOTE — PROGRESS NOTES
Contacted on call MD for patient concerning oral blisters. New order for Nystatin received from Dr. Toyin Bermudez.

## 2018-10-26 NOTE — PROGRESS NOTES
PT Note: Attempted PM treatment, patient declined due to \"being tired and wanting a nap. \" Educated patient on importance of mobility for overall health and recovery but patient insisted. Will check back later time/date as schedule allows.   
 
Thanks, 
Gilmar Blank, NIKAT

## 2018-10-26 NOTE — PROGRESS NOTES
ORTHO PROGRESS NOTE 2018 Admit Date: 10/25/2018 Admit Diagnosis: Lumbar stenosis with neurogenic claudication [M48.062] Spondylolisthesis, unspecified spinal region [M43.10] Post Op day: 1 Day Post-Op Subjective: Debra Corcoran is a patient who is now 1 Day Post-Op  and has no complaints. Objective:  
 
PT/OT: to progress today Vital Signs:   
Patient Vitals for the past 8 hrs: 
 BP Temp Pulse Resp SpO2  
10/26/18 0720 111/73 98.7 °F (37.1 °C) 98 17 93 % 10/26/18 0355 133/76 98 °F (36.7 °C) 87 16 99 % 10/26/18 0201 96/67 Gege Leelas   Temp (24hrs), Av.3 °F (36.8 °C), Min:97.6 °F (36.4 °C), Max:98.9 °F (37.2 °C) LAB:   
Recent Labs 10/26/18 
0501 HGB 12.1* WBC 14.9*  
 I/O: 
No intake/output data recorded. 10/24 190 - 10/26 0700 In: 2500 [I.V.:2500] Out: 7220 [Urine:2950; VLIJNQ:888] Physical Exam: 
 
Awake and in no acute distress. Mood and affect appropriate. Respirations unlabored and no evidence cyanosis. Calves nontender. Abdomen soft and nontender. Dressing clean/dry No new neurologic deficit. Assessment:  
  
Patient Active Problem List  
Diagnosis Code  Spinal stenosis of lumbosacral region M48.07  Spondylolisthesis of lumbosacral region M43.17  
 
 
1 Day Post-Op STATUS POST Procedure(s): 
L4-S1 LAMI FUSION ; L4-L5 TLIF Plan:  
 
Continue PT/OT/Rehab Discontinue: gil Consult: none Anticipate discharge to: HOME this weekend Signed By: Sharee Miller NP

## 2018-10-26 NOTE — PROGRESS NOTES
Problem: Falls - Risk of 
Goal: *Absence of Falls Document Ortiz Kins Fall Risk and appropriate interventions in the flowsheet. Outcome: Progressing Towards Goal 
Fall Risk Interventions: 
Mobility Interventions: Communicate number of staff needed for ambulation/transfer, PT Consult for mobility concerns, PT Consult for assist device competence, Patient to call before getting OOB Medication Interventions: Bed/chair exit alarm, Patient to call before getting OOB, Teach patient to arise slowly Elimination Interventions: Bed/chair exit alarm, Call light in reach

## 2018-10-27 LAB
ERYTHROCYTE [DISTWIDTH] IN BLOOD BY AUTOMATED COUNT: 12.6 %
HCT VFR BLD AUTO: 36 % (ref 41.1–50.3)
HGB BLD-MCNC: 11.9 G/DL (ref 13.6–17.2)
MCH RBC QN AUTO: 31.2 PG (ref 26.1–32.9)
MCHC RBC AUTO-ENTMCNC: 33.1 G/DL (ref 31.4–35)
MCV RBC AUTO: 94.2 FL (ref 79.6–97.8)
NRBC # BLD: 0 K/UL (ref 0–0.2)
PLATELET # BLD AUTO: 230 K/UL (ref 150–450)
PMV BLD AUTO: 9.7 FL (ref 9.4–12.3)
RBC # BLD AUTO: 3.82 M/UL (ref 4.23–5.6)
WBC # BLD AUTO: 11.7 K/UL (ref 4.3–11.1)

## 2018-10-27 PROCEDURE — 74011250637 HC RX REV CODE- 250/637: Performed by: ORTHOPAEDIC SURGERY

## 2018-10-27 PROCEDURE — 65270000029 HC RM PRIVATE

## 2018-10-27 PROCEDURE — 85027 COMPLETE CBC AUTOMATED: CPT

## 2018-10-27 PROCEDURE — 36415 COLL VENOUS BLD VENIPUNCTURE: CPT

## 2018-10-27 PROCEDURE — 74011000250 HC RX REV CODE- 250: Performed by: NURSE PRACTITIONER

## 2018-10-27 PROCEDURE — 74011250637 HC RX REV CODE- 250/637: Performed by: NURSE PRACTITIONER

## 2018-10-27 PROCEDURE — 97530 THERAPEUTIC ACTIVITIES: CPT

## 2018-10-27 PROCEDURE — 74011250636 HC RX REV CODE- 250/636: Performed by: ORTHOPAEDIC SURGERY

## 2018-10-27 RX ORDER — SIMETHICONE 80 MG
80 TABLET,CHEWABLE ORAL
Status: DISCONTINUED | OUTPATIENT
Start: 2018-10-27 | End: 2018-10-28 | Stop reason: HOSPADM

## 2018-10-27 RX ORDER — OXYCODONE AND ACETAMINOPHEN 10; 325 MG/1; MG/1
2 TABLET ORAL
Status: DISCONTINUED | OUTPATIENT
Start: 2018-10-27 | End: 2018-10-28 | Stop reason: HOSPADM

## 2018-10-27 RX ORDER — POLYETHYLENE GLYCOL 3350 17 G/17G
17 POWDER, FOR SOLUTION ORAL DAILY
Status: DISCONTINUED | OUTPATIENT
Start: 2018-10-27 | End: 2018-10-28 | Stop reason: HOSPADM

## 2018-10-27 RX ORDER — OXYCODONE AND ACETAMINOPHEN 10; 325 MG/1; MG/1
1 TABLET ORAL
Status: DISCONTINUED | OUTPATIENT
Start: 2018-10-27 | End: 2018-10-28 | Stop reason: HOSPADM

## 2018-10-27 RX ADMIN — Medication 1 AMPULE: at 08:11

## 2018-10-27 RX ADMIN — MAGNESIUM HYDROXIDE 30 ML: 400 SUSPENSION ORAL at 08:11

## 2018-10-27 RX ADMIN — OXYCODONE HYDROCHLORIDE AND ACETAMINOPHEN 2 TABLET: 10; 325 TABLET ORAL at 14:32

## 2018-10-27 RX ADMIN — GABAPENTIN 100 MG: 100 CAPSULE ORAL at 08:10

## 2018-10-27 RX ADMIN — Medication 10 ML: at 14:32

## 2018-10-27 RX ADMIN — OXYCODONE HYDROCHLORIDE AND ACETAMINOPHEN 2 TABLET: 10; 325 TABLET ORAL at 08:11

## 2018-10-27 RX ADMIN — Medication 1 AMPULE: at 22:08

## 2018-10-27 RX ADMIN — HYDROMORPHONE HYDROCHLORIDE 1 MG: 1 INJECTION, SOLUTION INTRAMUSCULAR; INTRAVENOUS; SUBCUTANEOUS at 02:20

## 2018-10-27 RX ADMIN — Medication 5 ML: at 06:00

## 2018-10-27 RX ADMIN — SIMETHICONE CHEW TAB 80 MG 80 MG: 80 TABLET ORAL at 14:32

## 2018-10-27 RX ADMIN — NYSTATIN 500000 UNITS: 500000 SUSPENSION ORAL at 08:11

## 2018-10-27 RX ADMIN — PANTOPRAZOLE SODIUM 40 MG: 40 TABLET, DELAYED RELEASE ORAL at 05:18

## 2018-10-27 RX ADMIN — NYSTATIN 500000 UNITS: 500000 SUSPENSION ORAL at 19:00

## 2018-10-27 RX ADMIN — OXYCODONE HYDROCHLORIDE AND ACETAMINOPHEN 2 TABLET: 10; 325 TABLET ORAL at 19:00

## 2018-10-27 RX ADMIN — DEXTROSE MONOHYDRATE, SODIUM CHLORIDE, AND POTASSIUM CHLORIDE 100 ML/HR: 50; 4.5; 1.49 INJECTION, SOLUTION INTRAVENOUS at 02:20

## 2018-10-27 RX ADMIN — GABAPENTIN 100 MG: 100 CAPSULE ORAL at 18:59

## 2018-10-27 RX ADMIN — OXYCODONE HYDROCHLORIDE AND ACETAMINOPHEN 2 TABLET: 10; 325 TABLET ORAL at 23:20

## 2018-10-27 RX ADMIN — HYDROCODONE BITARTRATE AND ACETAMINOPHEN 1 TABLET: 10; 325 TABLET ORAL at 05:18

## 2018-10-27 RX ADMIN — Medication 5 ML: at 22:00

## 2018-10-27 RX ADMIN — POLYETHYLENE GLYCOL 3350 17 G: 17 POWDER, FOR SOLUTION ORAL at 14:32

## 2018-10-27 NOTE — PROGRESS NOTES
Problem: Mobility Impaired (Adult and Pediatric) Goal: *Acute Goals and Plan of Care (Insert Text) STG: 
(1.)Mr. Glo Olivarez will move from supine to sit and sit to supine , scoot up and down and roll side to side with CONTACT GUARD ASSIST within 3 treatment day(s). (2.)Mr. Glo Olivarez will transfer from bed to chair and chair to bed with STAND BY ASSIST using the least restrictive device within 3 treatment day(s). . MET 10/27/18 
(3.)Mr. Glo Olivarez will ambulate with CONTACT GUARD ASSIST for 100 feet with the least restrictive device within 3 treatment day(s). MET 10/27/18 LTG: 
(1.)Mr. Glo Olivarez will move from supine to sit and sit to supine , scoot up and down and roll side to side in bed with MODIFIED INDEPENDENCE within 7 treatment day(s). (2.)Mr. Glo Olivarez will transfer from bed to chair and chair to bed with MODIFIED INDEPENDENCE using the least restrictive device within 7 treatment day(s). (3.)Mr. Glo Olivarez will ambulate with MODIFIED INDEPENDENCE for 250+ feet with the least restrictive device within 7 treatment day(s). (4.)Mr. Simental will ascend/descend 6 steps with one rail and MINIMAL ASSIST within 7 treatment days. ________________________________________________________________________________________________ PHYSICAL THERAPY: Daily Note, Treatment Day: 1st, AM 10/27/2018INPATIENT: Hospital Day: 3 Payor: BLUE CROSS / Plan: [place] / Product Type: PPO /  
  
NAME/AGE/GENDER: Bowen Mendoza is a 57 y.o. male PRIMARY DIAGNOSIS: Lumbar stenosis with neurogenic claudication [M48.062] Spondylolisthesis, unspecified spinal region [M43.10] Spinal stenosis of lumbosacral region Spinal stenosis of lumbosacral region Procedure(s) (LRB): 
L4-S1 LAMI FUSION ; L4-L5 TLIF (N/A) 2 Days Post-Op ICD-10: Treatment Diagnosis: · Difficulty in walking, Not elsewhere classified (R26.2) Precaution/Allergies: 
Sulfa (sulfonamide antibiotics) Spinal brace ASSESSMENT:  
 Mr. Garland Biggs was supine in bed upon arrival and agreeable to PT. Pt required min-modA for supine to sit using log roll. He was able to stand with CGA-SBA and demonstrated good to fair standing balance. Pt continues to require assistance to don spinal brace. Pt ambulated in [place] with RW and CGA progressing from step-to gait to reciprocal gait with verbal cues. Pt returned to room and performed standing activities with close supervision. He also transferred several time from chair with SBA. Pt progressed in ambulation and functional mobility. This section established at most recent assessment PROBLEM LIST (Impairments causing functional limitations): 1. Decreased Transfer Abilities 2. Decreased Ambulation Ability/Technique 3. Decreased Balance 4. Increased Pain 5. Decreased Activity Tolerance 6. Decreased Pacing Skills 7. Increased Fatigue 8. Decreased Knowledge of Precautions 9. Decreased [place] with Home Exercise Program 
 INTERVENTIONS PLANNED: (Benefits and precautions of physical therapy have been discussed with the patient.) 1. Balance Exercise 2. Bed Mobility 3. Family Education 4. Gait Training 5. Home Exercise Program (HEP) 6. Manual Therapy 7. Neuromuscular Re-education/Strengthening 8. Range of Motion (ROM) 9. Therapeutic Activites 10. Therapeutic Exercise/Strengthening 11. Transfer Training TREATMENT PLAN: Frequency/Duration: twice daily for duration of hospital stay Rehabilitation Potential For Stated Goals: Good RECOMMENDED REHABILITATION/EQUIPMENT: (at time of discharge pending progress): Due to the probability of continued deficits (see above) this patient will likely need continued skilled physical therapy after discharge. Equipment:  
? None at this time HISTORY:  
History of Present Injury/Illness (Reason for Referral): 
Procedure(s) (LRB): 
L4-S1 LAMI FUSION ; L4-L5 TLIF (N/A) Past Medical History/Comorbidities: Mr. Oates Seen  has a past medical history of Back pain and GERD (gastroesophageal reflux disease). Mr. Oates Seen  has a past surgical history that includes L4-S1 LAMI FUSION ; L4-L5 TLIF (N/A, 10/25/2018). Social History/Living Environment:  
Home Environment: Apartment # Steps to Enter: 18(up 6 with a landing + down 6 with a landing + down 6 ) Rails to Enter: Yes Hand Rails : Right Wheelchair Ramp: No 
One/Two Story Residence: One story # of Interior Steps: 12 Living Alone: No 
Support Systems: Spouse/Significant Other/Partner Patient Expects to be Discharged to[de-identified] Private residence Current DME Used/Available at Home: Raised toilet seat, Cane, straight, Walker, rolling Tub or Shower Type: Tub/Shower combination Prior Level of Function/Work/Activity: 
Pt states he is staying with his fiancee, ambulates independently, drives, works full time job requiring prolonged standing and walking, no falls, and has the following DME at home: RW, SPC, and elevated toilet seat. Number of Personal Factors/Comorbidities that affect the Plan of Care: 3+: HIGH COMPLEXITY EXAMINATION:  
Most Recent Physical Functioning:  
Gross Assessment: 
  
         
  
Posture: 
  
Balance: 
Sitting: Intact Standing: Impaired Standing - Static: Good Standing - Dynamic : Fair Bed Mobility: 
Rolling: Stand-by assistance Supine to Sit: Minimum assistance; Moderate assistance Wheelchair Mobility: 
  
Transfers: 
Sit to Stand: Stand-by assistance Stand to Sit: Stand-by assistance Bed to Chair: Contact guard assistance Interventions: Safety awareness training;Verbal cues Gait: 
  
Speed/Cheri: Slow Step Length: Right shortened;Left shortened Gait Abnormalities: Decreased step clearance Distance (ft): 250 Feet (ft) Assistive Device: Walker, rolling;Brace/Splint Ambulation - Level of Assistance: Contact guard assistance Interventions: Verbal cues; Visual/Demos; Safety awareness training; Tactile cues Body Structures Involved: 1. Heart 2. Lungs 3. Bones 4. Joints 5. Muscles 6. Ligaments Body Functions Affected: 1. Sensory/Pain 2. Cardio 3. Respiratory 4. Neuromusculoskeletal 
5. Movement Related Activities and Participation Affected: 1. Mobility 2. Domestic Life 3. Interpersonal Interactions and Relationships 4. Community, Social and [place] Number of elements that affect the Plan of Care: 4+: HIGH COMPLEXITY CLINICAL PRESENTATION:  
Presentation: Evolving clinical presentation with changing clinical characteristics: MODERATE COMPLEXITY CLINICAL DECISION MAKING:  
[facility] AM-PAC 6 Clicks Basic Mobility Inpatient Short Form How much difficulty does the patient currently have. .. Unable A Lot A Little None 1. Turning over in bed (including adjusting bedclothes, sheets and blankets)? [] 1   [] 2   [x] 3   [] 4  
2. Sitting down on and standing up from a chair with arms ( e.g., wheelchair, bedside commode, etc.)   [] 1   [] 2   [x] 3   [] 4  
3. Moving from lying on back to sitting on the side of the bed? [] 1   [x] 2   [] 3   [] 4 How much help from another person does the patient currently need. .. Total A Lot A Little None 4. Moving to and from a bed to a chair (including a wheelchair)? [] 1   [] 2   [x] 3   [] 4  
5. Need to walk in hospital room? [] 1   [] 2   [x] 3   [] 4  
6. Climbing 3-5 steps with a railing? [] 1   [x] 2   [] 3   [] 4  
© 2007, Trustees of [facility], under license to [employer]. All rights reserved Score:  Initial: 16 Most Recent: X (Date: -- ) Interpretation of Tool:  Represents activities that are increasingly more difficult (i.e. Bed mobility, Transfers, Gait). Score 24 23 22-20 19-15 14-10 9-7 6 Modifier CH CI CJ CK CL CM CN   
 
? Mobility - Walking and Moving Around:  
  - CURRENT STATUS: CK - 40%-59% impaired, limited or restricted  - GOAL STATUS: CJ - 20%-39% impaired, limited or restricted  - D/C STATUS:  ---------------To be determined--------------- Payor: LIZZY JACKSON / Plan: LEBRON [place] / Product Type: PPO /   
 
Medical Necessity:    
· Patient demonstrates good rehab potential due to higher previous functional level. Reason for Services/Other Comments: 
· Patient continues to require skilled intervention due to impaired activity tolerance and functional mobility. Use of outcome tool(s) and clinical judgement create a POC that gives a: Clear prediction of patient's progress: LOW COMPLEXITY  
  
 
 
 
TREATMENT:  
(In addition to Assessment/Re-Assessment sessions the following treatments were rendered) Pre-treatment Symptoms/Complaints:  Post-op back pain 
Pain: Initial:  
Pain Intensity 1: 5 Pain Location 1: Back Pain Orientation 1: Lower  Post Session:  4/10 Therapeutic Activity: (    23 minutes): Therapeutic activities including Bed transfers, Chair transfers, STS transfers, Ambulation on level ground, standing balance activities, and education on don/doff brace/spinal precautions/log roll technique to improve mobility, strength, balance and coordination. Required min-mod Verbal cues; Visual/Demos; Safety awareness training; Tactile cues to promote dynamic balance in standing and promote coordination of bilateral, upper extremity(s), lower extremity(s). Braces/Orthotics/Lines/Etc:  
· IV 
· drain hemovac · lumbar brace Treatment/Session Assessment:   
· Response to Treatment:  See above · Interdisciplinary Collaboration:  
o Physical Therapist 
o Registered Nurse · After treatment position/precautions:  
o Up in chair 
o Bed/Chair-wheels locked 
o Bed in low position 
o Visitors at bedside · Compliance with Program/Exercises: Will assess as treatment progresses · Recommendations/Intent for next treatment session: \"Next visit will focus on advancements to more challenging activities and reduction in assistance provided\". Total Treatment Duration: PT Patient Time In/Time Out Time In: 1100 Time Out: 1123 Gayl Severs, PT, DPT

## 2018-10-27 NOTE — PROGRESS NOTES
Problem: Mobility Impaired (Adult and Pediatric) Goal: *Acute Goals and Plan of Care (Insert Text) STG: 
(1.)Mr. Carlos Knowles will move from supine to sit and sit to supine , scoot up and down and roll side to side with CONTACT GUARD ASSIST within 3 treatment day(s). (2.)Mr. Carlos Knowles will transfer from bed to chair and chair to bed with STAND BY ASSIST using the least restrictive device within 3 treatment day(s). . MET 10/27/18 
(3.)Mr. Carlos Knowles will ambulate with CONTACT GUARD ASSIST for 100 feet with the least restrictive device within 3 treatment day(s). MET 10/27/18 LTG: 
(1.)Mr. Carlos Knowles will move from supine to sit and sit to supine , scoot up and down and roll side to side in bed with MODIFIED INDEPENDENCE within 7 treatment day(s). (2.)Mr. Carlos Knowles will transfer from bed to chair and chair to bed with MODIFIED INDEPENDENCE using the least restrictive device within 7 treatment day(s). (3.)Mr. Carlos Knowles will ambulate with MODIFIED INDEPENDENCE for 250+ feet with the least restrictive device within 7 treatment day(s). (4.)Mr. Simental will ascend/descend 6 steps with one rail and MINIMAL ASSIST within 7 treatment days. ________________________________________________________________________________________________ PHYSICAL THERAPY: Daily Note, Treatment Day: 1st, PM 10/27/2018INPATIENT: Hospital Day: 3 Payor: BLUE CROSS / Plan: [facility] Lexus Delvalle / Product Type: PPO /   
SPINAL PRECAUTIONS and SPINAL BRACE 
  
NAME/AGE/GENDER: Raji Mendoza is a 57 y.o. male PRIMARY DIAGNOSIS: Lumbar stenosis with neurogenic claudication [M48.062] Spondylolisthesis, unspecified spinal region [M43.10] Spinal stenosis of lumbosacral region Spinal stenosis of lumbosacral region Procedure(s) (LRB): 
L4-S1 LAMI FUSION ; L4-L5 TLIF (N/A) 2 Days Post-Op ICD-10: Treatment Diagnosis: · Difficulty in walking, Not elsewhere classified (R26.2) Precaution/Allergies: 
Sulfa (sulfonamide antibiotics) Spinal brace ASSESSMENT:  
Mr. Laura Villarreal was supine in bed upon arrival and agreeable to PT. Pt required verbal cues for log roll technique due to attempted twisting in bed. He was given [place] for supine to sit and CGA for STS with RW. Pt again given assistance to don brace prior to ambulation. Pt ambulated in [place] with RW and SBA for increased distance with cues for posture. Pt also negotiated stairs with CGA and step-to gait. Pt returned to room and transferred to chair with SBA. Pt progressed in ambulation, stairs, and functional mobility. This section established at most recent assessment PROBLEM LIST (Impairments causing functional limitations): 1. Decreased Transfer Abilities 2. Decreased Ambulation Ability/Technique 3. Decreased Balance 4. Increased Pain 5. Decreased Activity Tolerance 6. Decreased Pacing Skills 7. Increased Fatigue 8. Decreased Knowledge of Precautions 9. Decreased [place] with Home Exercise Program 
 INTERVENTIONS PLANNED: (Benefits and precautions of physical therapy have been discussed with the patient.) 1. Balance Exercise 2. Bed Mobility 3. Family Education 4. Gait Training 5. Home Exercise Program (HEP) 6. Manual Therapy 7. Neuromuscular Re-education/Strengthening 8. Range of Motion (ROM) 9. Therapeutic Activites 10. Therapeutic Exercise/Strengthening 11. Transfer Training TREATMENT PLAN: Frequency/Duration: twice daily for duration of hospital stay Rehabilitation Potential For Stated Goals: Good RECOMMENDED REHABILITATION/EQUIPMENT: (at time of discharge pending progress): Due to the probability of continued deficits (see above) this patient will likely need continued skilled physical therapy after discharge. Equipment:  
? None at this time HISTORY:  
History of Present Injury/Illness (Reason for Referral): 
Procedure(s) (LRB): 
L4-S1 LAMI FUSION ; L4-L5 TLIF (N/A) Past Medical History/Comorbidities: Mr. Antoine Hernandez  has a past medical history of Back pain and GERD (gastroesophageal reflux disease). Mr. Antoine Hernandez  has a past surgical history that includes L4-S1 LAMI FUSION ; L4-L5 TLIF (N/A, 10/25/2018). Social History/Living Environment:  
Home Environment: Apartment # Steps to Enter: 18(up 6 with a landing + down 6 with a landing + down 6 ) Rails to Enter: Yes Hand Rails : Right Wheelchair Ramp: No 
One/Two Story Residence: One story # of Interior Steps: 12 Living Alone: No 
Support Systems: Spouse/Significant Other/Partner Patient Expects to be Discharged to[de-identified] Private residence Current DME Used/Available at Home: Raised toilet seat, Cane, straight, Walker, rolling Tub or Shower Type: Tub/Shower combination Prior Level of Function/Work/Activity: 
Pt states he is staying with his fiancee, ambulates independently, drives, works full time job requiring prolonged standing and walking, no falls, and has the following DME at home: RW, SPC, and elevated toilet seat. Number of Personal Factors/Comorbidities that affect the Plan of Care: 3+: HIGH COMPLEXITY EXAMINATION:  
Most Recent Physical Functioning:  
Gross Assessment: 
  
         
  
Posture: 
  
Balance: 
Sitting: Intact Standing: Impaired Standing - Static: Good Standing - Dynamic : Fair Bed Mobility: 
Rolling: Contact guard assistance Supine to Sit: Contact guard assistance;Minimum assistance Interventions: Safety awareness training;Verbal cues;Manual cues Wheelchair Mobility: 
  
Transfers: 
Sit to Stand: Stand-by assistance;Contact guard assistance Stand to Sit: Stand-by assistance Bed to Chair: Stand-by assistance Interventions: Safety awareness training;Verbal cues Gait: 
  
Speed/Cheri: Slow Step Length: Right shortened;Left shortened Gait Abnormalities: Decreased step clearance Distance (ft): 500 Feet (ft) Assistive Device: Walker, rolling;Brace/Splint Ambulation - Level of Assistance: Stand-by assistance Number of Stairs Trained: 2 Stairs - Level of Assistance: Contact guard assistance Rail Use: Both Interventions: Safety awareness training;Verbal cues; Visual/Demos Body Structures Involved: 1. Heart 2. Lungs 3. Bones 4. Joints 5. Muscles 6. Ligaments Body Functions Affected: 1. Sensory/Pain 2. Cardio 3. Respiratory 4. Neuromusculoskeletal 
5. Movement Related Activities and Participation Affected: 1. Mobility 2. Domestic Life 3. Interpersonal Interactions and Relationships 4. Community, Social and [place] Number of elements that affect the Plan of Care: 4+: HIGH COMPLEXITY CLINICAL PRESENTATION:  
Presentation: Evolving clinical presentation with changing clinical characteristics: MODERATE COMPLEXITY CLINICAL DECISION MAKING:  
[facility] AM-PAC 6 Clicks Basic Mobility Inpatient Short Form How much difficulty does the patient currently have. .. Unable A Lot A Little None 1. Turning over in bed (including adjusting bedclothes, sheets and blankets)? [] 1   [] 2   [x] 3   [] 4  
2. Sitting down on and standing up from a chair with arms ( e.g., wheelchair, bedside commode, etc.)   [] 1   [] 2   [x] 3   [] 4  
3. Moving from lying on back to sitting on the side of the bed? [] 1   [x] 2   [] 3   [] 4 How much help from another person does the patient currently need. .. Total A Lot A Little None 4. Moving to and from a bed to a chair (including a wheelchair)? [] 1   [] 2   [x] 3   [] 4  
5. Need to walk in hospital room? [] 1   [] 2   [x] 3   [] 4  
6. Climbing 3-5 steps with a railing? [] 1   [x] 2   [] 3   [] 4  
© 2007, Trustees of [facility], under license to [employer]. All rights reserved Score:  Initial: 16 Most Recent: X (Date: -- ) Interpretation of Tool:  Represents activities that are increasingly more difficult (i.e. Bed mobility, Transfers, Gait). Score 24 23 22-20 19-15 14-10 9-7 6  Modifier CH CI CJ CK CL CM CN   
 
 ? Mobility - Walking and Moving Around:  
  - CURRENT STATUS: CK - 40%-59% impaired, limited or restricted  - GOAL STATUS: CJ - 20%-39% impaired, limited or restricted  - D/C STATUS:  ---------------To be determined--------------- Payor: LIZZY JACKSON / Plan: [place] [facility] / Product Type: PPO /   
 
Medical Necessity:    
· Patient demonstrates good rehab potential due to higher previous functional level. Reason for Services/Other Comments: 
· Patient continues to require skilled intervention due to impaired activity tolerance and functional mobility. Use of outcome tool(s) and clinical judgement create a POC that gives a: Clear prediction of patient's progress: LOW COMPLEXITY  
  
 
 
 
TREATMENT:  
(In addition to Assessment/Re-Assessment sessions the following treatments were rendered) Pre-treatment Symptoms/Complaints:  Post-op back pain 
Pain: Initial:  
Pain Intensity 1: 5 Pain Location 1: Back Pain Orientation 1: Lower  Post Session:  6-7/10 Therapeutic Activity: (    23 minutes): Therapeutic activities including Bed transfers, Chair transfers, STS transfers, Ambulation on level ground, and standing balance activities to improve mobility, strength, balance and coordination. Required minSafety awareness training;Verbal cues; Visual/Demos to promote dynamic balance in standing and promote coordination of bilateral, upper extremity(s), lower extremity(s). Braces/Orthotics/Lines/Etc:  
· IV 
· drain hemovac · lumbar brace Treatment/Session Assessment:   
· Response to Treatment:  See above · Interdisciplinary Collaboration:  
o Physical Therapist 
o Registered Nurse · After treatment position/precautions:  
o Up in chair 
o Nurse at bedside 
o Visitors at bedside · Compliance with Program/Exercises: Will assess as treatment progresses · Recommendations/Intent for next treatment session:   \"Next visit will focus on advancements to more challenging activities and reduction in assistance provided\". Total Treatment Duration: PT Patient Time In/Time Out Time In: 6000 Time Out: 1525 Syeda Bah, PT, DPT

## 2018-10-27 NOTE — PROGRESS NOTES
Problem: Falls - Risk of 
Goal: *Absence of Falls Document Zachary Orozco Fall Risk and appropriate interventions in the flowsheet. Outcome: Progressing Towards Goal 
Fall Risk Interventions: 
Mobility Interventions: Bed/chair exit alarm, Communicate number of staff needed for ambulation/transfer, Patient to call before getting OOB Medication Interventions: Bed/chair exit alarm, Patient to call before getting OOB, Teach patient to arise slowly Elimination Interventions: Bed/chair exit alarm, Call light in reach, Toileting schedule/hourly rounds, Patient to call for help with toileting needs

## 2018-10-28 VITALS
HEIGHT: 69 IN | RESPIRATION RATE: 18 BRPM | WEIGHT: 188.19 LBS | DIASTOLIC BLOOD PRESSURE: 63 MMHG | SYSTOLIC BLOOD PRESSURE: 119 MMHG | HEART RATE: 86 BPM | TEMPERATURE: 98.3 F | BODY MASS INDEX: 27.87 KG/M2 | OXYGEN SATURATION: 93 %

## 2018-10-28 PROCEDURE — 74011250637 HC RX REV CODE- 250/637: Performed by: ORTHOPAEDIC SURGERY

## 2018-10-28 PROCEDURE — 74011250636 HC RX REV CODE- 250/636: Performed by: ORTHOPAEDIC SURGERY

## 2018-10-28 PROCEDURE — 74011000250 HC RX REV CODE- 250: Performed by: NURSE PRACTITIONER

## 2018-10-28 PROCEDURE — 97530 THERAPEUTIC ACTIVITIES: CPT

## 2018-10-28 RX ORDER — GABAPENTIN 100 MG/1
100 CAPSULE ORAL 2 TIMES DAILY
Qty: 30 CAP | Refills: 0 | Status: SHIPPED | OUTPATIENT
Start: 2018-10-28

## 2018-10-28 RX ORDER — OXYCODONE AND ACETAMINOPHEN 10; 325 MG/1; MG/1
2 TABLET ORAL
Qty: 40 TAB | Refills: 0 | Status: SHIPPED | OUTPATIENT
Start: 2018-10-28

## 2018-10-28 RX ADMIN — PANTOPRAZOLE SODIUM 40 MG: 40 TABLET, DELAYED RELEASE ORAL at 04:14

## 2018-10-28 RX ADMIN — DEXTROSE MONOHYDRATE, SODIUM CHLORIDE, AND POTASSIUM CHLORIDE 100 ML/HR: 50; 4.5; 1.49 INJECTION, SOLUTION INTRAVENOUS at 06:13

## 2018-10-28 RX ADMIN — POLYETHYLENE GLYCOL 3350 17 G: 17 POWDER, FOR SOLUTION ORAL at 08:37

## 2018-10-28 RX ADMIN — MAGNESIUM HYDROXIDE 30 ML: 400 SUSPENSION ORAL at 08:38

## 2018-10-28 RX ADMIN — Medication 10 ML: at 14:10

## 2018-10-28 RX ADMIN — NYSTATIN 500000 UNITS: 500000 SUSPENSION ORAL at 13:39

## 2018-10-28 RX ADMIN — OXYCODONE HYDROCHLORIDE AND ACETAMINOPHEN 2 TABLET: 10; 325 TABLET ORAL at 04:14

## 2018-10-28 RX ADMIN — OXYCODONE HYDROCHLORIDE AND ACETAMINOPHEN 2 TABLET: 10; 325 TABLET ORAL at 13:39

## 2018-10-28 RX ADMIN — Medication 1 AMPULE: at 08:38

## 2018-10-28 RX ADMIN — OXYCODONE HYDROCHLORIDE AND ACETAMINOPHEN 2 TABLET: 10; 325 TABLET ORAL at 08:38

## 2018-10-28 RX ADMIN — NYSTATIN 500000 UNITS: 500000 SUSPENSION ORAL at 08:38

## 2018-10-28 RX ADMIN — GABAPENTIN 100 MG: 100 CAPSULE ORAL at 08:38

## 2018-10-28 RX ADMIN — Medication 5 ML: at 06:00

## 2018-10-28 NOTE — PROGRESS NOTES
Pt's D/C instructions completed. Verbalized understanding of all instructions including diet, activity, s/sx to alert MD, medications, wound care, and f/u appointment. Family at [facility].

## 2018-10-28 NOTE — PROGRESS NOTES
Problem: Mobility Impaired (Adult and Pediatric) Goal: *Acute Goals and Plan of Care (Insert Text) STG: 
(1.)Mr. Gold Mcclendon will move from supine to sit and sit to supine , scoot up and down and roll side to side with CONTACT GUARD ASSIST within 3 treatment day(s). (2.)Mr. Gold Mcclendon will transfer from bed to chair and chair to bed with STAND BY ASSIST using the least restrictive device within 3 treatment day(s). . MET 10/27/18 
(3.)Mr. Gold Mcclendon will ambulate with CONTACT GUARD ASSIST for 100 feet with the least restrictive device within 3 treatment day(s). MET 10/27/18 LTG: 
(1.)Mr. Gold Mcclendon will move from supine to sit and sit to supine , scoot up and down and roll side to side in bed with MODIFIED INDEPENDENCE within 7 treatment day(s). (2.)Mr. Gold Mcclendon will transfer from bed to chair and chair to bed with MODIFIED INDEPENDENCE using the least restrictive device within 7 treatment day(s). (3.)Mr. Gold Mcclendon will ambulate with MODIFIED INDEPENDENCE for 250+ feet with the least restrictive device within 7 treatment day(s). (4.)Mr. Simental will ascend/descend 6 steps with one rail and MINIMAL ASSIST within 7 treatment days. ________________________________________________________________________________________________ PHYSICAL THERAPY: Daily Note, Treatment Day: 2nd, AM 10/28/2018INPATIENT: Hospital Day: 4 Payor: [facility] / Plan: [place] Onofre Binet / Product Type: PPO /   
SPINAL PRECAUTIONS and SPINAL BRACE 
  
NAME/AGE/GENDER: Tootie Vinson is a 57 y.o. male PRIMARY DIAGNOSIS: Lumbar stenosis with neurogenic claudication [M48.062] Spondylolisthesis, unspecified spinal region [M43.10] Spinal stenosis of lumbosacral region Spinal stenosis of lumbosacral region Procedure(s) (LRB): 
L4-S1 LAMI FUSION ; L4-L5 TLIF (N/A) 3 Days Post-Op ICD-10: Treatment Diagnosis: · Difficulty in walking, Not elsewhere classified (R26.2) Precaution/Allergies: 
Sulfa (sulfonamide antibiotics) Spinal brace ASSESSMENT:  
Mr. Brian Hackett was seated in bed after having drain removed by nurse. Pt agreeable to PT. Pt required assistance putting brace on and CGA to sit to stand transfer with RWW. Pt stood at sink for 3min while brushing teeth and implementing spinal precautions. Pt then ambulated in [place] with RW and SBA for 250ft with cues for posture. Pt also negotiated stairs with CGA and step-to gait pattern. Pt returned to room and transferred to chair with SBA. Pt reported he spent an increased amount of time standing and moving this morning and was tired therefore did not want to ambulate a very long distance today. Pt rated pain 5/10 today. This section established at most recent assessment PROBLEM LIST (Impairments causing functional limitations): 1. Decreased Transfer Abilities 2. Decreased Ambulation Ability/Technique 3. Decreased Balance 4. Increased Pain 5. Decreased Activity Tolerance 6. Decreased Pacing Skills 7. Increased Fatigue 8. Decreased Knowledge of Precautions 9. Decreased [place] with Home Exercise Program 
 INTERVENTIONS PLANNED: (Benefits and precautions of physical therapy have been discussed with the patient.) 1. Balance Exercise 2. Bed Mobility 3. Family Education 4. Gait Training 5. Home Exercise Program (HEP) 6. Manual Therapy 7. Neuromuscular Re-education/Strengthening 8. Range of Motion (ROM) 9. Therapeutic Activites 10. Therapeutic Exercise/Strengthening 11. Transfer Training TREATMENT PLAN: Frequency/Duration: twice daily for duration of hospital stay Rehabilitation Potential For Stated Goals: Good RECOMMENDED REHABILITATION/EQUIPMENT: (at time of discharge pending progress): Due to the probability of continued deficits (see above) this patient will likely need continued skilled physical therapy after discharge. Equipment:  
? None at this time HISTORY:  
History of Present Injury/Illness (Reason for Referral): 
Procedure(s) (LRB): 
 L4-S1 LAMI FUSION ; L4-L5 TLIF (N/A) Past Medical History/Comorbidities:  
Mr. Maci Engel  has a past medical history of Back pain and GERD (gastroesophageal reflux disease). Mr. Maci Engel  has a past surgical history that includes L4-S1 LAMI FUSION ; L4-L5 TLIF (N/A, 10/25/2018). Social History/Living Environment:  
Home Environment: Apartment # Steps to Enter: 18(up 6 with a landing + down 6 with a landing + down 6 ) Rails to Enter: Yes Hand Rails : Right Wheelchair Ramp: No 
One/Two Story Residence: One story # of Interior Steps: 12 Living Alone: No 
Support Systems: Spouse/Significant Other/Partner Patient Expects to be Discharged to[de-identified] Private residence Current DME Used/Available at Home: Raised toilet seat, Cane, straight, Walker, rolling Tub or Shower Type: Tub/Shower combination Prior Level of Function/Work/Activity: 
Pt states he is staying with his fiancee, ambulates independently, drives, works full time job requiring prolonged standing and walking, no falls, and has the following DME at home: RW, SPC, and elevated toilet seat. Number of Personal Factors/Comorbidities that affect the Plan of Care: 3+: HIGH COMPLEXITY EXAMINATION:  
Most Recent Physical Functioning:  
Gross Assessment: 
  
         
  
Posture: 
  
Balance: 
Sitting: Intact Sitting - Static: Good (unsupported) Sitting - Dynamic: Fair (occasional) Standing: Impaired Standing - Static: Good Standing - Dynamic : Fair Bed Mobility: 
Rolling: Contact guard assistance;Minimum assistance Supine to Sit: Contact guard assistance;Minimum assistance Wheelchair Mobility: 
  
Transfers: 
Sit to Stand: Contact guard assistance;Stand-by assistance Stand to Sit: Stand-by assistance Bed to Chair: Contact guard assistance;Stand-by assistance Gait: 
  
Speed/Cheri: Slow Step Length: Left shortened;Right shortened Gait Abnormalities: Decreased step clearance Distance (ft): 250 Feet (ft) Assistive Device: Walker, rolling;Brace/Splint Ambulation - Level of Assistance: Stand-by assistance Number of Stairs Trained: 2 Stairs - Level of Assistance: Contact guard assistance Rail Use: Both Interventions: Safety awareness training;Verbal cues; Visual/Demos Body Structures Involved: 1. Heart 2. Lungs 3. Bones 4. Joints 5. Muscles 6. Ligaments Body Functions Affected: 1. Sensory/Pain 2. Cardio 3. Respiratory 4. Neuromusculoskeletal 
5. Movement Related Activities and Participation Affected: 1. Mobility 2. Domestic Life 3. Interpersonal Interactions and Relationships 4. Community, Social and [place] Number of elements that affect the Plan of Care: 4+: HIGH COMPLEXITY CLINICAL PRESENTATION:  
Presentation: Evolving clinical presentation with changing clinical characteristics: MODERATE COMPLEXITY CLINICAL DECISION MAKING:  
[facility] AM-PAC 6 Clicks Basic Mobility Inpatient Short Form How much difficulty does the patient currently have. .. Unable A Lot A Little None 1. Turning over in bed (including adjusting bedclothes, sheets and blankets)? [] 1   [] 2   [x] 3   [] 4  
2. Sitting down on and standing up from a chair with arms ( e.g., wheelchair, bedside commode, etc.)   [] 1   [] 2   [x] 3   [] 4  
3. Moving from lying on back to sitting on the side of the bed? [] 1   [x] 2   [] 3   [] 4 How much help from another person does the patient currently need. .. Total A Lot A Little None 4. Moving to and from a bed to a chair (including a wheelchair)? [] 1   [] 2   [x] 3   [] 4  
5. Need to walk in hospital room? [] 1   [] 2   [x] 3   [] 4  
6. Climbing 3-5 steps with a railing? [] 1   [x] 2   [] 3   [] 4  
© 2007, Trustees of [facility], under license to [employer]. All rights reserved Score:  Initial: 16 Most Recent: X (Date: -- ) Interpretation of Tool:  Represents activities that are increasingly more difficult (i.e. Bed mobility, Transfers, Gait). Score 24 23 22-20 19-15 14-10 9-7 6 Modifier CH CI CJ CK CL CM CN   
 
? Mobility - Walking and Moving Around:  
  - CURRENT STATUS: CK - 40%-59% impaired, limited or restricted  - GOAL STATUS: CJ - 20%-39% impaired, limited or restricted  - D/C STATUS:  ---------------To be determined--------------- Payor: LIZZY JACKSON / [facility] / Product Type: PPO /   
 
Medical Necessity:    
· Patient demonstrates good rehab potential due to higher previous functional level. Reason for Services/Other Comments: 
· Patient continues to require skilled intervention due to impaired activity tolerance and functional mobility. Use of outcome tool(s) and clinical judgement create a POC that gives a: Clear prediction of patient's progress: LOW COMPLEXITY  
  
 
 
 
TREATMENT:  
(In addition to Assessment/Re-Assessment sessions the following treatments were rendered) Pre-treatment Symptoms/Complaints:  Post-op back pain 
Pain: Initial: 5/10 Post Session:  5/10 Therapeutic Activity: (    19 minutes): Therapeutic activities including Bed transfers, Chair transfers, STS transfers, Ambulation on level ground, and standing balance activities to improve mobility, strength, balance and coordination. Required minSafety awareness training;Verbal cues; Visual/Demos to promote dynamic balance in standing and promote coordination of bilateral, upper extremity(s), lower extremity(s). Braces/Orthotics/Lines/Etc:  
· IV 
· drain hemovac · lumbar brace Treatment/Session Assessment:   
· Response to Treatment:  Pt tolerated ambulation well. Was unable to ambulate 500ft this morning due to being fatigued from increased activities this morning. · Interdisciplinary Collaboration:  
o Physical Therapist 
o Registered Nurse · After treatment position/precautions:  
o Up in chair 
o Nurse at bedside 
o Visitors at bedside · Compliance with Program/Exercises: Will assess as treatment progresses · Recommendations/Intent for next treatment session: \"Next visit will focus on advancements to more challenging activities and reduction in assistance provided\". Total Treatment Duration: PT Patient Time In/Time Out Time In: 1026 Time Out: 1045 Charity Baker, PT, DPT

## 2018-10-28 NOTE — PROGRESS NOTES
2018 Post Op day: 3 Days Post-Op Admit Date: 10/25/2018 Admit Diagnosis: Lumbar stenosis with neurogenic claudication [M48.062] Spondylolisthesis, unspecified spinal region [M43.10] Subjective: Doing well, No complaints, No SOB, No Chest Pain, No Nausea or Vomitting. Doing much better with Percocet. PT/OT:  
  
  
Activity Response: Tolerated well Assistive Device: Fall prevention device Weight Bearing Status: WBAT 
BMP: 
No results for input(s): CREA, BUN, NA, K, CL, CO2, AGAP, GLU in the last 72 hours. Patient Vitals for the past 8 hrs: 
 BP Temp Pulse Resp SpO2  
10/28/18 0720 119/63 98.3 °F (36.8 °C) 86 18 93 % 10/28/18 0418 134/80 98.8 °F (37.1 °C) 88 17 94 % Temp (24hrs), Av.5 °F (36.9 °C), Min:98.3 °F (36.8 °C), Max:98.9 °F (37.2 °C) CBC: 
Recent Labs 10/27/18 
3252 10/26/18 
0501 10/25/18 
1912 WBC 11.7* 14.9* 15.9* HGB 11.9* 12.1* 12.6* HCT 36.0* 35.7* 36.7*  
 272 271 Microbiology:  
 
All Micro Results Procedure Component Value Units Date/Time MRSA SCREEN - PCR (NASAL) [[id number]] Order Status:  Sent Specimen:  Nasal from Nares Objective: Vital Signs are Stable, No Acute Distress, Alert and Oriented Dressing is Dry,  Neurovascular exam is normal. Drain in place Drain output 80 over last shift. POD #3 Assessment: 
Patient Active Problem List  
Diagnosis Code  Spinal stenosis of lumbosacral region M48.07  Spondylolisthesis of lumbosacral region M43.17  Spondylolisthesis at L5-S1 level M43.17 Plan: Continue Physical Therapy Dc drain today. Monitor Hbg and labs. Dispo this pm if doing well.   
 
Signed By: Beatrice Blanchard MD

## 2018-10-28 NOTE — PROGRESS NOTES
Problem: Falls - Risk of 
Goal: *Absence of Falls Document [facility] Fall Risk and appropriate interventions in the flowsheet. Outcome: Progressing Towards Goal 
Fall Risk Interventions: 
Mobility Interventions: Bed/chair exit alarm, Communicate number of staff needed for ambulation/transfer, Patient to call before getting OOB Medication Interventions: Bed/chair exit alarm, Evaluate medications/consider consulting pharmacy, Patient to call before getting OOB Elimination Interventions: Bed/chair exit alarm, Call light in reach, Patient to call for help with toileting needs

## 2018-10-28 NOTE — DISCHARGE INSTRUCTIONS
DISCHARGE SUMMARY from Nurse    PATIENT INSTRUCTIONS:    After general anesthesia or intravenous sedation, for 24 hours or while taking prescription Narcotics:  · Limit your activities  · Do not drive and operate hazardous machinery  · Do not make important personal or business decisions  · Do  not drink alcoholic beverages  · If you have not urinated within 8 hours after discharge, please contact your surgeon on call. Report the following to your surgeon:  · Excessive pain, swelling, redness or odor of or around the surgical area  · Temperature over 100.5  · Nausea and vomiting lasting longer than 4 hours or if unable to take medications  · Any signs of decreased circulation or nerve impairment to extremity: change in color, persistent  numbness, tingling, coldness or increase pain  · Any questions      *  Please give a list of your current medications to your Primary Care Provider. *  Please update this list whenever your medications are discontinued, doses are      changed, or new medications (including over-the-counter products) are added. *  Please carry medication information at all times in case of emergency situations. These are general instructions for a healthy lifestyle:    No smoking/ No tobacco products/ Avoid exposure to second hand smoke  Surgeon General's Warning:  Quitting smoking now greatly reduces serious risk to your health. Obesity, smoking, and sedentary lifestyle greatly increases your risk for illness    A healthy diet, regular physical exercise & weight monitoring are important for maintaining a healthy lifestyle    You may be retaining fluid if you have a history of heart failure or if you experience any of the following symptoms:  Weight gain of 3 pounds or more overnight or 5 pounds in a week, increased swelling in our hands or feet or shortness of breath while lying flat in bed.   Please call your doctor as soon as you notice any of these symptoms; do not wait until your next office visit. Recognize signs and symptoms of STROKE:    F-face looks uneven    A-arms unable to move or move unevenly    S-speech slurred or non-existent    T-time-call 911 as soon as signs and symptoms begin-DO NOT go       Back to bed or wait to see if you get better-TIME IS BRAIN. Warning Signs of HEART ATTACK     Call 911 if you have these symptoms:   Chest discomfort. Most heart attacks involve discomfort in the center of the chest that lasts more than a few minutes, or that goes away and comes back. It can feel like uncomfortable pressure, squeezing, fullness, or pain.  Discomfort in other areas of the upper body. Symptoms can include pain or discomfort in one or both arms, the back, neck, jaw, or stomach.  Shortness of breath with or without chest discomfort.  Other signs may include breaking out in a cold sweat, nausea, or lightheadedness. Don't wait more than five minutes to call 911 - MINUTES MATTER! Fast action can save your life. Calling 911 is almost always the fastest way to get lifesaving treatment. Emergency Medical Services staff can begin treatment when they arrive -- up to an hour sooner than if someone gets to the hospital by car. The discharge information has been reviewed with the patient. The patient verbalized understanding. Discharge medications reviewed with the patient and appropriate educational materials and side effects teaching were provided.   ___________________________________________________________________________________________________________________________________

## 2018-11-05 NOTE — DISCHARGE SUMMARY
[facility]    Coreen Paredes  MR#: [id number]  : 1970  ACCOUNT #: [de-identified]   ADMIT DATE: 10/25/2018  DISCHARGE DATE: 10/28/2018    PRIMARY DIAGNOSES:  L5-S1 grade III spondylolisthesis with foraminal stenosis. POSTOPERATIVE DIAGNOSES:  L5-S1 grade III spondylolisthesis with foraminal stenosis. PROCEDURE:  L4-S1 posterior instrumentation and posterior fusion with an L4-L5 interbody device and interbody fusion and a decompression laminectomy, foraminotomy at L5-S1, and no interbody fusion was performed at that level. The date was 10/25/2018. HISTORY OF PRESENT ILLNESS:  The patient is a 72-year-old gentleman who has grade III spondylolisthesis at L5-S1 who has had progressively worsening back and leg pain, claudication symptoms. He initially did well with activity restriction, medications, and epidural blocks, but those have subsequently become ineffective, and he presented for surgery on 10/25. He was admitted, taken to surgery where he underwent his L4-S1 interbody fusion at L4-5 and decompression at L5-S1, and he tolerated this well, and he was taken to floor after surgery and his course has been unremarkable. He had a Hemovac drain that was removed on postoperative day number 3 without problem, his Pichardo catheter removed on postoperative day number 1 without any problem. He has remained afebrile with stable vital signs. His hemoglobin has remained in an acceptable level. He was seen by therapy and started on gait training in his brace, and he progressed well and really had no problems, and by postoperative day three, 10/28, he was ready for discharge so we discharged him home. He will be on Norco 10 for pain. He is to leave his dressing in place for a week. He has a followup in 2 weeks. Any problems prior to that, he will give us a call. He should wear his brace except when he is sleeping.        DISPOSITION:  We discharged him home.       Cortes Sarabia III, MD SEL/SN  D: 11/04/2018 10:48     T: 11/05/2018 07:25  JOB #: [id number]

## (undated) DEVICE — REM POLYHESIVE ADULT PATIENT RETURN ELECTRODE: Brand: VALLEYLAB

## (undated) DEVICE — KIT POS W/ FOAM ARM CRADL SHEARGUARD CHST PD CVR FOR SPNL

## (undated) DEVICE — NEUROMONITORING KIT

## (undated) DEVICE — MASTISOL ADHESIVE LIQ 2/3ML

## (undated) DEVICE — DRAPE XR C ARM 41X74IN LF --

## (undated) DEVICE — CONTAINER,SPECIMEN,O.R.STRL,4.5OZ: Brand: MEDLINE

## (undated) DEVICE — SUTURE VCRL SZ 1 L27IN ABSRB UD L36MM CP-1 1/2 CIR REV CUT J268H

## (undated) DEVICE — (D)PREP SKN CHLRAPRP APPL 26ML -- CONVERT TO ITEM 371833

## (undated) DEVICE — KIT EVAC 400CC DIA3/16IN H PAT 12.5IN 3 SPR RND SHP PVC DRN

## (undated) DEVICE — SOLUTION IV 1000ML 0.9% SOD CHL

## (undated) DEVICE — KENDALL SCD EXPRESS SLEEVES, KNEE LENGTH, MEDIUM: Brand: KENDALL SCD

## (undated) DEVICE — DRAPE SHT 3 QTR PROXIMA 53X77 --

## (undated) DEVICE — INTENDED FOR TISSUE SEPARATION, AND OTHER PROCEDURES THAT REQUIRE A SHARP SURGICAL BLADE TO PUNCTURE OR CUT.: Brand: BARD-PARKER SAFETY BLADES SIZE 10, STERILE

## (undated) DEVICE — TRAY CATH 16F DRN BG LTX -- CONVERT TO ITEM 363158

## (undated) DEVICE — BIPOLAR SEALER 23-112-1 AQM 6.0: Brand: AQUAMANTYS ®

## (undated) DEVICE — INTENDED FOR TISSUE SEPARATION, AND OTHER PROCEDURES THAT REQUIRE A SHARP SURGICAL BLADE TO PUNCTURE OR CUT.: Brand: BARD-PARKER SAFETY BLADES SIZE 15, STERILE

## (undated) DEVICE — 5.0MM PRECISION ROUND

## (undated) DEVICE — THE MILL DISPOSABLE - FINE

## (undated) DEVICE — 3M™ STERI-STRIP™ REINFORCED ADHESIVE SKIN CLOSURES, R1548, 1 IN X 5 IN (25 MM X 125 MM), 4 STRIPS/ENVELOPE: Brand: 3M™ STERI-STRIP™

## (undated) DEVICE — WAX SURG 2.5GM HEMSTAT BNE BEESWAX PARAFFIN ISO PALMITATE

## (undated) DEVICE — SUTURE VCRL SZ 2-0 L27IN ABSRB UD L36MM CP-1 1/2 CIR REV J266H

## (undated) DEVICE — FLOSEAL HEMOSTATIC MATRIX, 10 ML: Brand: FLOSEAL

## (undated) DEVICE — PACK PROCEDURE SURG POST LAMINECTOMY CDS

## (undated) DEVICE — SUTURE VCRL + 3-0 L27IN ABSRB UD PS-2 L19MM 3/8 CIR PRIM VCP427H

## (undated) DEVICE — STERILE PACKAGE WITH CANNULA: Brand: LITE BIO DELIVERY SYSTEM

## (undated) DEVICE — 1010 S-DRAPE TOWEL DRAPE 10/BX: Brand: STERI-DRAPE™

## (undated) DEVICE — SYR 10ML LUER LOK 1/5ML GRAD --

## (undated) DEVICE — 2000CC GUARDIAN II: Brand: GUARDIAN

## (undated) DEVICE — JAM SHIDI: Brand: XIA PRECISION SYSTEM

## (undated) DEVICE — AMD ANTIMICROBIAL GAUZE SPONGES,12 PLY USP TYPE VII, 0.2% POLYHEXAMETHYLENE BIGUANIDE HCI (PHMB): Brand: CURITY

## (undated) DEVICE — SYR BULB 60ML IRRIGATION -- CONVERT TO ITEM 116413